# Patient Record
Sex: FEMALE | Race: WHITE | NOT HISPANIC OR LATINO | Employment: PART TIME | URBAN - METROPOLITAN AREA
[De-identification: names, ages, dates, MRNs, and addresses within clinical notes are randomized per-mention and may not be internally consistent; named-entity substitution may affect disease eponyms.]

---

## 2017-04-04 ENCOUNTER — HOSPITAL ENCOUNTER (OUTPATIENT)
Facility: MEDICAL CENTER | Age: 22
End: 2017-04-04
Attending: OBSTETRICS & GYNECOLOGY | Admitting: OBSTETRICS & GYNECOLOGY
Payer: COMMERCIAL

## 2017-04-04 VITALS
DIASTOLIC BLOOD PRESSURE: 85 MMHG | HEART RATE: 96 BPM | SYSTOLIC BLOOD PRESSURE: 113 MMHG | WEIGHT: 193 LBS | TEMPERATURE: 97.1 F | HEIGHT: 65 IN | BODY MASS INDEX: 32.15 KG/M2

## 2017-04-04 PROCEDURE — 59025 FETAL NON-STRESS TEST: CPT | Performed by: OBSTETRICS & GYNECOLOGY

## 2017-04-04 NOTE — PROGRESS NOTES
Pt presents to L&D for c/o UC's for the past hour. Pt states UC's occur every 5 minutes. Pt states + fetal movement, denies LOF, bright red vaginal bleeding. EFM and TOCO applied. SVE- closed. Pts EDC is 17 making her 39 . Pt is a . Will monitor and update MD.

## 2017-04-06 ENCOUNTER — HOSPITAL ENCOUNTER (INPATIENT)
Facility: MEDICAL CENTER | Age: 22
LOS: 2 days | End: 2017-04-08
Attending: OBSTETRICS & GYNECOLOGY | Admitting: OBSTETRICS & GYNECOLOGY
Payer: COMMERCIAL

## 2017-04-06 LAB
BASOPHILS # BLD AUTO: 0.5 % (ref 0–1.8)
BASOPHILS # BLD: 0.07 K/UL (ref 0–0.12)
EOSINOPHIL # BLD AUTO: 0.18 K/UL (ref 0–0.51)
EOSINOPHIL NFR BLD: 1.3 % (ref 0–6.9)
ERYTHROCYTE [DISTWIDTH] IN BLOOD BY AUTOMATED COUNT: 43.2 FL (ref 35.9–50)
HCT VFR BLD AUTO: 34.7 % (ref 37–47)
HGB BLD-MCNC: 11.3 G/DL (ref 12–16)
HOLDING TUBE BB 8507: NORMAL
IMM GRANULOCYTES # BLD AUTO: 0.19 K/UL (ref 0–0.11)
IMM GRANULOCYTES NFR BLD AUTO: 1.4 % (ref 0–0.9)
LYMPHOCYTES # BLD AUTO: 3.16 K/UL (ref 1–4.8)
LYMPHOCYTES NFR BLD: 22.6 % (ref 22–41)
MCH RBC QN AUTO: 26.1 PG (ref 27–33)
MCHC RBC AUTO-ENTMCNC: 32.6 G/DL (ref 33.6–35)
MCV RBC AUTO: 80.1 FL (ref 81.4–97.8)
MONOCYTES # BLD AUTO: 1.06 K/UL (ref 0–0.85)
MONOCYTES NFR BLD AUTO: 7.6 % (ref 0–13.4)
NEUTROPHILS # BLD AUTO: 9.33 K/UL (ref 2–7.15)
NEUTROPHILS NFR BLD: 66.6 % (ref 44–72)
NRBC # BLD AUTO: 0 K/UL
NRBC BLD AUTO-RTO: 0 /100 WBC
PLATELET # BLD AUTO: 162 K/UL (ref 164–446)
PMV BLD AUTO: 11.7 FL (ref 9–12.9)
RBC # BLD AUTO: 4.33 M/UL (ref 4.2–5.4)
WBC # BLD AUTO: 14 K/UL (ref 4.8–10.8)

## 2017-04-06 PROCEDURE — 59409 OBSTETRICAL CARE: CPT

## 2017-04-06 PROCEDURE — 85025 COMPLETE CBC W/AUTO DIFF WBC: CPT

## 2017-04-06 PROCEDURE — 10907ZC DRAINAGE OF AMNIOTIC FLUID, THERAPEUTIC FROM PRODUCTS OF CONCEPTION, VIA NATURAL OR ARTIFICIAL OPENING: ICD-10-PCS | Performed by: OBSTETRICS & GYNECOLOGY

## 2017-04-06 PROCEDURE — 4A1H74Z MONITORING OF PRODUCTS OF CONCEPTION, CARDIAC ELECTRICAL ACTIVITY, VIA NATURAL OR ARTIFICIAL OPENING: ICD-10-PCS | Performed by: OBSTETRICS & GYNECOLOGY

## 2017-04-06 PROCEDURE — 700111 HCHG RX REV CODE 636 W/ 250 OVERRIDE (IP)

## 2017-04-06 PROCEDURE — 0KQM0ZZ REPAIR PERINEUM MUSCLE, OPEN APPROACH: ICD-10-PCS | Performed by: OBSTETRICS & GYNECOLOGY

## 2017-04-06 PROCEDURE — 304965 HCHG RECOVERY SERVICES

## 2017-04-06 PROCEDURE — 700102 HCHG RX REV CODE 250 W/ 637 OVERRIDE(OP): Performed by: OBSTETRICS & GYNECOLOGY

## 2017-04-06 PROCEDURE — 36415 COLL VENOUS BLD VENIPUNCTURE: CPT

## 2017-04-06 PROCEDURE — 700111 HCHG RX REV CODE 636 W/ 250 OVERRIDE (IP): Performed by: OBSTETRICS & GYNECOLOGY

## 2017-04-06 PROCEDURE — 303615 HCHG EPIDURAL/SPINAL ANESTHESIA FOR LABOR

## 2017-04-06 PROCEDURE — 10H07YZ INSERTION OF OTHER DEVICE INTO PRODUCTS OF CONCEPTION, VIA NATURAL OR ARTIFICIAL OPENING: ICD-10-PCS | Performed by: OBSTETRICS & GYNECOLOGY

## 2017-04-06 PROCEDURE — A9270 NON-COVERED ITEM OR SERVICE: HCPCS | Performed by: OBSTETRICS & GYNECOLOGY

## 2017-04-06 PROCEDURE — 85027 COMPLETE CBC AUTOMATED: CPT

## 2017-04-06 PROCEDURE — 770002 HCHG ROOM/CARE - OB PRIVATE (112)

## 2017-04-06 RX ORDER — OXYCODONE AND ACETAMINOPHEN 7.5; 325 MG/1; MG/1
1 TABLET ORAL EVERY 4 HOURS PRN
Qty: 15 TAB | Refills: 0 | Status: ON HOLD | OUTPATIENT
Start: 2017-04-06 | End: 2019-02-15

## 2017-04-06 RX ORDER — AMPICILLIN 2 G/1
2 INJECTION, POWDER, FOR SOLUTION INTRAVENOUS
Status: COMPLETED | OUTPATIENT
Start: 2017-04-06 | End: 2017-04-06

## 2017-04-06 RX ORDER — DOCUSATE SODIUM 100 MG/1
100 CAPSULE, LIQUID FILLED ORAL 2 TIMES DAILY PRN
Status: DISCONTINUED | OUTPATIENT
Start: 2017-04-06 | End: 2017-04-08 | Stop reason: HOSPADM

## 2017-04-06 RX ORDER — VITAMIN A ACETATE, BETA CAROTENE, ASCORBIC ACID, CHOLECALCIFEROL, .ALPHA.-TOCOPHEROL ACETATE, DL-, THIAMINE MONONITRATE, RIBOFLAVIN, NIACINAMIDE, PYRIDOXINE HYDROCHLORIDE, FOLIC ACID, CYANOCOBALAMIN, CALCIUM CARBONATE, FERROUS FUMARATE, ZINC OXIDE, CUPRIC OXIDE 3080; 12; 120; 400; 1; 1.84; 3; 20; 22; 920; 25; 200; 27; 10; 2 [IU]/1; UG/1; MG/1; [IU]/1; MG/1; MG/1; MG/1; MG/1; MG/1; [IU]/1; MG/1; MG/1; MG/1; MG/1; MG/1
1 TABLET, FILM COATED ORAL EVERY MORNING
Status: DISCONTINUED | OUTPATIENT
Start: 2017-04-06 | End: 2017-04-08 | Stop reason: HOSPADM

## 2017-04-06 RX ORDER — OXYCODONE HYDROCHLORIDE AND ACETAMINOPHEN 5; 325 MG/1; MG/1
1 TABLET ORAL EVERY 4 HOURS PRN
Status: DISCONTINUED | OUTPATIENT
Start: 2017-04-06 | End: 2017-04-08 | Stop reason: HOSPADM

## 2017-04-06 RX ORDER — AMPICILLIN 1 G/1
1 INJECTION, POWDER, FOR SOLUTION INTRAMUSCULAR; INTRAVENOUS EVERY 4 HOURS
Status: DISCONTINUED | OUTPATIENT
Start: 2017-04-06 | End: 2017-04-06

## 2017-04-06 RX ORDER — LIDOCAINE HCL/EPINEPHRINE/PF 2%-1:200K
VIAL (ML) INJECTION
Status: DISCONTINUED
Start: 2017-04-06 | End: 2017-04-06

## 2017-04-06 RX ORDER — SODIUM CHLORIDE, SODIUM LACTATE, POTASSIUM CHLORIDE, CALCIUM CHLORIDE 600; 310; 30; 20 MG/100ML; MG/100ML; MG/100ML; MG/100ML
INJECTION, SOLUTION INTRAVENOUS PRN
Status: DISCONTINUED | OUTPATIENT
Start: 2017-04-06 | End: 2017-04-08 | Stop reason: HOSPADM

## 2017-04-06 RX ORDER — IBUPROFEN 600 MG/1
600 TABLET ORAL EVERY 6 HOURS PRN
Qty: 30 TAB | Refills: 1 | Status: ON HOLD | OUTPATIENT
Start: 2017-04-06 | End: 2019-02-15

## 2017-04-06 RX ORDER — SODIUM CHLORIDE, SODIUM LACTATE, POTASSIUM CHLORIDE, CALCIUM CHLORIDE 600; 310; 30; 20 MG/100ML; MG/100ML; MG/100ML; MG/100ML
INJECTION, SOLUTION INTRAVENOUS CONTINUOUS
Status: DISCONTINUED | OUTPATIENT
Start: 2017-04-06 | End: 2017-04-06

## 2017-04-06 RX ORDER — ONDANSETRON 2 MG/ML
4 INJECTION INTRAMUSCULAR; INTRAVENOUS EVERY 6 HOURS PRN
Status: DISCONTINUED | OUTPATIENT
Start: 2017-04-06 | End: 2017-04-08 | Stop reason: HOSPADM

## 2017-04-06 RX ORDER — MISOPROSTOL 200 UG/1
800 TABLET ORAL
Status: COMPLETED | OUTPATIENT
Start: 2017-04-06 | End: 2017-04-06

## 2017-04-06 RX ORDER — SODIUM CHLORIDE, SODIUM LACTATE, POTASSIUM CHLORIDE, CALCIUM CHLORIDE 600; 310; 30; 20 MG/100ML; MG/100ML; MG/100ML; MG/100ML
INJECTION, SOLUTION INTRAVENOUS
Status: COMPLETED
Start: 2017-04-06 | End: 2017-04-06

## 2017-04-06 RX ORDER — IBUPROFEN 600 MG/1
600 TABLET ORAL EVERY 6 HOURS PRN
Status: DISCONTINUED | OUTPATIENT
Start: 2017-04-06 | End: 2017-04-08 | Stop reason: HOSPADM

## 2017-04-06 RX ORDER — ONDANSETRON 4 MG/1
4 TABLET, ORALLY DISINTEGRATING ORAL EVERY 6 HOURS PRN
Status: DISCONTINUED | OUTPATIENT
Start: 2017-04-06 | End: 2017-04-08 | Stop reason: HOSPADM

## 2017-04-06 RX ORDER — OXYCODONE AND ACETAMINOPHEN 10; 325 MG/1; MG/1
1 TABLET ORAL EVERY 4 HOURS PRN
Status: DISCONTINUED | OUTPATIENT
Start: 2017-04-06 | End: 2017-04-08 | Stop reason: HOSPADM

## 2017-04-06 RX ORDER — ROPIVACAINE HYDROCHLORIDE 2 MG/ML
INJECTION, SOLUTION EPIDURAL; INFILTRATION; PERINEURAL
Status: COMPLETED
Start: 2017-04-06 | End: 2017-04-06

## 2017-04-06 RX ORDER — MISOPROSTOL 200 UG/1
600 TABLET ORAL
Status: DISCONTINUED | OUTPATIENT
Start: 2017-04-06 | End: 2017-04-08 | Stop reason: HOSPADM

## 2017-04-06 RX ORDER — AMPICILLIN 2 G/1
2 INJECTION, POWDER, FOR SOLUTION INTRAVENOUS ONCE
Status: DISCONTINUED | OUTPATIENT
Start: 2017-04-06 | End: 2017-04-06

## 2017-04-06 RX ADMIN — AMPICILLIN SODIUM 2 G: 2 INJECTION, POWDER, FOR SOLUTION INTRAVENOUS at 07:24

## 2017-04-06 RX ADMIN — Medication 125 MILLI-UNITS/MIN: at 16:16

## 2017-04-06 RX ADMIN — AMPICILLIN SODIUM 1 G: 1 INJECTION, POWDER, FOR SOLUTION INTRAMUSCULAR; INTRAVENOUS at 11:49

## 2017-04-06 RX ADMIN — MISOPROSTOL 800 MCG: 200 TABLET ORAL at 15:21

## 2017-04-06 RX ADMIN — SODIUM CHLORIDE, POTASSIUM CHLORIDE, SODIUM LACTATE AND CALCIUM CHLORIDE: 600; 310; 30; 20 INJECTION, SOLUTION INTRAVENOUS at 09:10

## 2017-04-06 RX ADMIN — SODIUM CHLORIDE, POTASSIUM CHLORIDE, SODIUM LACTATE AND CALCIUM CHLORIDE: 600; 310; 30; 20 INJECTION, SOLUTION INTRAVENOUS at 07:37

## 2017-04-06 RX ADMIN — Medication 1 MILLI-UNITS/MIN: at 07:24

## 2017-04-06 RX ADMIN — ROPIVACAINE HYDROCHLORIDE 200 MG: 2 INJECTION, SOLUTION EPIDURAL; INFILTRATION at 09:34

## 2017-04-06 ASSESSMENT — PATIENT HEALTH QUESTIONNAIRE - PHQ9
SUM OF ALL RESPONSES TO PHQ9 QUESTIONS 1 AND 2: 0
2. FEELING DOWN, DEPRESSED, IRRITABLE, OR HOPELESS: NOT AT ALL
1. LITTLE INTEREST OR PLEASURE IN DOING THINGS: NOT AT ALL
SUM OF ALL RESPONSES TO PHQ QUESTIONS 1-9: 0

## 2017-04-06 ASSESSMENT — LIFESTYLE VARIABLES
ALCOHOL_USE: NO
EVER_SMOKED: NEVER
DO YOU DRINK ALCOHOL: NO

## 2017-04-06 ASSESSMENT — PAIN SCALES - GENERAL
PAINLEVEL_OUTOF10: 3
PAINLEVEL_OUTOF10: 0

## 2017-04-06 ASSESSMENT — COPD QUESTIONNAIRES
HAVE YOU SMOKED AT LEAST 100 CIGARETTES IN YOUR ENTIRE LIFE: NO/DON'T KNOW
DURING THE PAST 4 WEEKS HOW MUCH DID YOU FEEL SHORT OF BREATH: NONE/LITTLE OF THE TIME
DO YOU EVER COUGH UP ANY MUCUS OR PHLEGM?: NO/ONLY WITH OCCASIONAL COLDS OR INFECTIONS

## 2017-04-06 NOTE — IP AVS SNAPSHOT
4/8/2017          Rai Carrero  [address]  [place] [address]    Dear Rai:    [facility] wants to ensure your discharge home is safe and you or your loved ones have had all your questions answered regarding your care after you leave the hospital.    You may receive a telephone call within two days of your discharge.  This call is to make certain you understand your discharge instructions as well as ensure we provided you with the best care possible during your stay with us.     The call will only last approximately 3-5 minutes and will be done by a nurse.    Once again, we want to ensure your discharge home is safe and that you have a clear understanding of any next steps in your care.  If you have any questions or concerns, please do not hesitate to contact us, we are here for you.  Thank you for choosing [facility] for your healthcare needs.    Sincerely,    Cali Huntley    [facility]

## 2017-04-06 NOTE — H&P
CHIEF COMPLAINT:  Here for induction of labor.    HISTORY OF PRESENT ILLNESS:  Patient is a 21-year-old  2, para 0 female   with a due date of , placing her at 40+ weeks gestation, admitted   for elective induction of labor.  She began her prenatal care at 6 weeks'   gestational age.  Dating is based on her early ultrasound.  She has had   comprehensive prenatal care and she did test positive for group B strep   recently.  She has had no other identified risk factors.    PAST MEDICAL HISTORY:  Patient has no significant medical illnesses.    PREVIOUS SURGERIES:  Include tonsillectomy.    FAMILY HISTORY:  Noncontributory.    SOCIAL HISTORY:  She is single, works at [employer].  She has a supportive   significant other, does not smoke.    REVIEW OF SYSTEMS:  She has had no leaking of fluid or vaginal bleeding.    PHYSICAL EXAMINATION:  GENERAL:  She is pleasant, healthy appearing, in no distress.  HEAD AND NECK:  Unremarkable.  LUNGS:  Clear.  HEART:  Regular rate and rhythm.  ABDOMEN:  Estimated fetal weight is 8 pounds.  GENITOURINARY:  Cervix is 2 cm dilated, -2 station, 80%, membranes were   ruptured for clear fluid and intrauterine pressure monitor was placed.    IMPRESSION:  1.  Term pregnancy.  2.  Favorable cervix, desires induction.    PLAN:  Patient will have a Pitocin amniotomy induction of labor.  She is aware   of the options expected management.  She is aware of the risks and   complications associated with induction of labor.  Anticipate vaginal   delivery.       ____________________________________     MD CORBY RAYMOND / NEGRO    DD:  2017 07:39:52  DT:  2017 07:50:05    D#:  [id number]  Job#:  [id number]

## 2017-04-06 NOTE — PROGRESS NOTES
of via Male at 1507 delivered by Dr. Cano Apgars 8/9. Mom and baby stable, baby skin to skin / swadled and handed to mom to bond. 2 lac, repaired. No complications. Patient cleaned and settled for comfort. family at bedside. Patient now recovering, will continue to monitor and assess mom and baby.for remainder of her recovery.

## 2017-04-06 NOTE — IP AVS SNAPSHOT
Home Care Instructions                                                                                                                Rai Carrero   MRN: [id number]    Department:  POST PARTUM 31   2017           Follow-up Information     1. Follow up with Aly Cano M.D.. Schedule an appointment as soon as possible for a visit in 6 weeks.    Specialty:  OB/Gyn    Contact information    [address]  [address]  [place] [address]  [phone]         I assume responsibility for securing a follow-up [place] Screening blood test on my baby within the specified date range.    -                  Discharge Instructions       POSTPARTUM DISCHARGE INSTRUCTIONS FOR MOM    YOB: 1995   Age: 21 y.o.               Admit Date: 2017     Discharge Date: 2017  Attending Doctor:  Aly Cano M.D.                  Allergies:  Review of patient's allergies indicates no known allergies.    Discharged to home by car. Discharged via wheelchair, hospital escort: Yes.  Special equipment needed: Not Applicable  Belongings with: Personal  Be sure to schedule a follow-up appointment with your primary care doctor or any specialists as instructed.     Discharge Plan:   Influenza Vaccine Indication: Patient Refuses    REASONS TO CALL YOUR OBSTETRICIAN:  1.   Persistent fever or shaking chills (Temperature higher than 100.4)  2.   Heavy bleeding (soaking more than 1 pad per hour); Passing clots  3.   Foul odor from vagina  4.   Mastitis (Breast infection; breast pain, chills, fever, redness)  5.   Urinary pain, burning or frequency  6.   Episiotomy infection  7.   Abdominal incision infection  8.   Severe depression longer than 24 hours    HAND WASHING  · Prior to handling the baby.  · Before breastfeeding or bottle feeding baby.  · After using the bathroom or changing the baby's diaper.    WOUND CARE  Ask your physician for additional care instructions.  In general:    ·  Incision:      · Keep clean and  "dry.    · Do NOT lift anything heavier than your baby for up to 6 weeks.    · There should not be any opening or pus.      VAGINAL CARE  · Nothing inside vagina for 6 weeks: no sexual intercourse, tampons or douching.  · Bleeding may continue for 2-4 weeks.  Amount may vary.    · Call your physician for heavy bleeding which means soaking more than 1 pad per hour    BIRTH CONTROL  · It is possible to become pregnant at any time after delivery and while breastfeeding.  · Plan to discuss a method of birth control with your physician at your follow up visit. visit.    DIET AND ELIMINATION  · Eating more fiber (bran cereal, fruits, and vegetables) and drinking plenty of fluids will help to avoid constipation.  · Urinary frequency after childbirth is normal.    POSTPARTUM BLUES  During the first few days after birth, you may experience a sense of the \"blues\" which may include impatience, irritability or even crying.  These feeling come and go quickly.  However, as many as 1 in 10 women experience emotional symptoms known as postpartum depression.    Postpartum depression:  May start as early as the second or third day after delivery or take several weeks or months to develop.  Symptoms of \"blues\" are present, but are more intense:  Crying spells; loss of appetite; feelings of hopelessness or loss of control; fear of touching the baby; over concern or no concern at all about the baby; little or no concern about your own appearance/caring for yourself; and/or inability to sleep or excessive sleeping.  Contact your physician if you are experiencing any of these symptoms.    Crisis Hotline:  · [place] Crisis Hotline:  7-619-BCGDYPW  Or 1-942.613.3528  · [place] Crisis Hotline:  1-225.153.8293  Or 087-258-6626    PREVENTING SHAKEN BABY:  If you are angry or stressed, PUT THE BABY IN THE CRIB, step away, take some deep breaths, and wait until you are calm to care for the baby.  DO NOT SHAKE THE BABY.  You are not alone, call a " "supporter for help.    · Crisis Call Center 24/7 crisis line 231-746-1061 or 1-256.837.1215  · You can also text them, text \"ANSWER\" to 040504    QUIT SMOKING/TOBACCO USE:  I understand the use of any tobacco products increases my chance of suffering from future heart disease and could cause other illnesses which may shorten my life. Quitting the use of tobacco products is the single most important thing I can do to improve my health. For further information on smoking / tobacco cessation call a Toll Free Quit Line at 1-359.832.7547 (*[employer] [place]) or 1-247.630.9159 (American Lung Association) or you can access the web based program at www.lungusa.org.    · [place] Tobacco Users Help Line:  (614) 547-6591       Toll Free: 1-792.866.7375  · Quit Tobacco Program [facility] (979)287-8901    DEPRESSION / SUICIDE RISK:  As you are discharged from this [facility], it is important to learn how to keep safe from harming yourself.    Recognize the warning signs:  · Abrupt changes in personality, positive or negative- including increase in energy   · Giving away possessions  · Change in eating patterns- significant weight changes-  positive or negative  · Change in sleeping patterns- unable to sleep or sleeping all the time   · Unwillingness or inability to communicate  · Depression  · Unusual sadness, discouragement and loneliness  · Talk of wanting to die  · Neglect of personal appearance   · Rebelliousness- reckless behavior  · Withdrawal from people/activities they love  · Confusion- inability to concentrate     If you or a loved one observes any of these behaviors or has concerns about self-harm, here's what you can do:  · Talk about it- your feelings and reasons for harming yourself  · Remove any means that you might use to hurt yourself (examples: pills, rope, extension cords, firearm)  · Get professional help from the community (Mental Health, Substance Abuse, " psychological counseling)  · Do not be alone:Call your Safe Contact- someone whom you trust who will be there for you.  · Call your local CRISIS HOTLINE 288-6464 or 846-460-4480  · Call your local Children's Mobile Crisis Response Team [place] (682) 068-8453 or www.[employer]  · Call the toll free National Suicide Prevention Hotlines   · National Suicide Prevention Lifeline 906-143-MBKK (2297)  · National Hope Line Network 800-SUICIDE (016-1442)    DISCHARGE SURVEY:  Thank you for choosing [facility].  We hope we provided you with very good care.  You may be receiving a survey in the mail.  Please fill it out.  Your opinion is valuable to us.    ADDITIONAL EDUCATIONAL MATERIALS GIVEN TO PATIENT:        My signature on this form indicates that:  1.  I have reviewed and understand the above information  2.  My questions regarding this information have been answered to my satisfaction.  3.  I have formulated a plan with my discharge nurse to obtain my prescribed medication for home.         Discharge Medication Instructions:    Below are the medications your physician expects you to take upon discharge:    Review all your home medications and newly ordered medications with your doctor and/or pharmacist. Follow medication instructions as directed by your doctor and/or pharmacist.    Please keep your medication list with you and share with your physician.               Medication List      START taking these medications        Instructions    oxycodone-acetaminophen 7.5-325 MG per tablet   Commonly known as:  PERCOCET    Take 1 Tab by mouth every four hours as needed.   Dose:  1 Tab         CHANGE how you take these medications        Instructions    ibuprofen 600 MG Tabs   What changed:    - medication strength  - how much to take   Last time this was given:  600 mg on 4/8/2017  1:49 AM   Commonly known as:  MOTRIN    Take 1 Tab by mouth every 6 hours as needed.   Dose:  600 mg         CONTINUE taking  these medications        Instructions    omeprazole 20 MG delayed-release capsule   Commonly known as:  PRILOSEC    Take 1 Cap by mouth every day.   Dose:  20 mg       PRE-RODRIGUE PO    Take  by mouth.       sucralfate 1 GM Tabs   Commonly known as:  CARAFATE    Take 1 Tab by mouth 4 Times a Day,Before Meals and at Bedtime.   Dose:  1 g       TYLENOL PO    Take  by mouth.               Crisis Hotline:     [place] Crisis Hotline:  0-701-XJWQSKS or 1-446.611.7697    [place] Crisis Hotline:    1-321.578.1811 or 908-162-1440        Disclaimer           _____________________________________                     __________       ________       Patient/Mother Signature or Legal                          Date                   Time

## 2017-04-06 NOTE — CARE PLAN
Problem: Pain  Goal: Patient will have relaxed facial expressions and be able to rest between uterine contractions  Intervention: Administer pain medications per MD order and/or assist with epidural/spinal placement  Pt comfortable with her epidural resting quietly no needs

## 2017-04-06 NOTE — IP AVS SNAPSHOT
Animating Touch Access Code: [email or web address]  Expires: 5/8/2017  8:32 AM    Animating Touch  A secure, online tool to manage your health information     [employer]’s Animating Touch® is a secure, online tool that connects you to your personalized health information from the privacy of your home -- day or night - making it very easy for you to manage your healthcare. Once the activation process is completed, you can even access your medical information using the Animating Touch willem, which is available for free in the Apple Willem store or Google Play store.     Animating Touch provides the following levels of access (as shown below):   My Chart Features   [facility] Primary Care Doctor [facility]  Specialists [facility]  Urgent  Care [facility]  Primary Care  Doctor   Email your healthcare team securely and privately 24/7 X X X X   Manage appointments: schedule your next appointment; view details of past/upcoming appointments X      Request prescription refills. X      View recent personal medical records, including lab and immunizations X X X X   View health record, including health history, allergies, medications X X X X   Read reports about your outpatient visits, procedures, consult and ER notes X X X X   See your discharge summary, which is a recap of your hospital and/or ER visit that includes your diagnosis, lab results, and care plan. X X       How to register for Animating Touch:  1. Go to  [email or web address].  2. Click on the Sign Up Now box, which takes you to the New Member Sign Up page. You will need to provide the following information:  a. Enter your Animating Touch Access Code exactly as it appears at the top of this page. (You will not need to use this code after you’ve completed the sign-up process. If you do not sign up before the expiration date, you must request a new code.)   b. Enter your date of birth.   c. Enter your home email address.   d. Click Submit, and follow the next screen’s instructions.  3. Create a Animating Touch ID. This will be your Animating Touch  login ID and cannot be changed, so think of one that is secure and easy to remember.  4. Create a [employer] password. You can change your password at any time.  5. Enter your Password Reset Question and Answer. This can be used at a later time if you forget your password.   6. Enter your e-mail address. This allows you to receive e-mail notifications when new information is available in Pavlok.  7. Click Sign Up. You can now view your health information.    For assistance activating your Pavlok account, call [phone]

## 2017-04-06 NOTE — PROGRESS NOTES
"Progress Note    Rai Knight   PP day 1  Chief Admitting Dx: Pregnancy  Delivery Type: vaginal, spontaneous      Subjective:  Ambulating: yes  Tolerating oral feed: yes  Flatus: yes    Voiding: yes  Dizziness: no  Filed Vitals:    04/06/17 0600   Height: 1.641 m (5' 4.61\")   Weight: 88.451 kg (195 lb)       Exam:  Abdomen: negative findings: soft, non-tender  Incision: none  Fundus Tenderness: no  Below umbilicus: yes  Perineum: midline episiotomy  Extremities: Normal  Lochia: mild    Labs:   Recent Results (from the past 24 hour(s))   CBC WITH DIFFERENTIAL    Collection Time: 04/06/17  6:30 AM   Result Value Ref Range    WBC 14.0 (H) 4.8 - 10.8 K/uL    RBC 4.33 4.20 - 5.40 M/uL    Hemoglobin 11.3 (L) 12.0 - 16.0 g/dL    Hematocrit 34.7 (L) 37.0 - 47.0 %    MCV 80.1 (L) 81.4 - 97.8 fL    MCH 26.1 (L) 27.0 - 33.0 pg    MCHC 32.6 (L) 33.6 - 35.0 g/dL    RDW 43.2 35.9 - 50.0 fL    Platelet Count 162 (L) 164 - 446 K/uL    MPV 11.7 9.0 - 12.9 fL    Neutrophils-Polys 66.60 44.00 - 72.00 %    Lymphocytes 22.60 22.00 - 41.00 %    Monocytes 7.60 0.00 - 13.40 %    Eosinophils 1.30 0.00 - 6.90 %    Basophils 0.50 0.00 - 1.80 %    Immature Granulocytes 1.40 (H) 0.00 - 0.90 %    Nucleated RBC 0.00 /100 WBC    Neutrophils (Absolute) 9.33 (H) 2.00 - 7.15 K/uL    Lymphs (Absolute) 3.16 1.00 - 4.80 K/uL    Monos (Absolute) 1.06 (H) 0.00 - 0.85 K/uL    Eos (Absolute) 0.18 0.00 - 0.51 K/uL    Baso (Absolute) 0.07 0.00 - 0.12 K/uL    Immature Granulocytes (abs) 0.19 (H) 0.00 - 0.11 K/uL    NRBC (Absolute) 0.00 K/uL   HOLD BLOOD BANK SPECIMEN (NOT TESTED)    Collection Time: 04/06/17  6:30 AM   Result Value Ref Range    Holding Tube - Bb DONE        Assessment:  Continue Routine postpartum care  Stable post partum      Plan:  Discharge Home    Aly Cano M.D.        "

## 2017-04-06 NOTE — CARE PLAN
Problem: Knowledge Deficit  Goal: Patient/Support person demonstrates understanding regarding the progression of labor, available options and participates in decision-making process  Intervention: Obtain imformed consent when appropriate  Pt consented for an epidural

## 2017-04-07 LAB
ERYTHROCYTE [DISTWIDTH] IN BLOOD BY AUTOMATED COUNT: 43 FL (ref 35.9–50)
HCT VFR BLD AUTO: 27.3 % (ref 37–47)
HGB BLD-MCNC: 9 G/DL (ref 12–16)
MCH RBC QN AUTO: 26.4 PG (ref 27–33)
MCHC RBC AUTO-ENTMCNC: 33 G/DL (ref 33.6–35)
MCV RBC AUTO: 80.1 FL (ref 81.4–97.8)
PLATELET # BLD AUTO: 142 K/UL (ref 164–446)
PMV BLD AUTO: 12 FL (ref 9–12.9)
RBC # BLD AUTO: 3.41 M/UL (ref 4.2–5.4)
WBC # BLD AUTO: 18.8 K/UL (ref 4.8–10.8)

## 2017-04-07 PROCEDURE — A9270 NON-COVERED ITEM OR SERVICE: HCPCS | Performed by: OBSTETRICS & GYNECOLOGY

## 2017-04-07 PROCEDURE — 770002 HCHG ROOM/CARE - OB PRIVATE (112)

## 2017-04-07 PROCEDURE — 700102 HCHG RX REV CODE 250 W/ 637 OVERRIDE(OP): Performed by: OBSTETRICS & GYNECOLOGY

## 2017-04-07 PROCEDURE — 700112 HCHG RX REV CODE 229: Performed by: OBSTETRICS & GYNECOLOGY

## 2017-04-07 RX ADMIN — IBUPROFEN 600 MG: 600 TABLET, FILM COATED ORAL at 12:51

## 2017-04-07 RX ADMIN — OXYCODONE HYDROCHLORIDE AND ACETAMINOPHEN 1 TABLET: 5; 325 TABLET ORAL at 22:47

## 2017-04-07 RX ADMIN — OXYCODONE HYDROCHLORIDE AND ACETAMINOPHEN 1 TABLET: 5; 325 TABLET ORAL at 05:03

## 2017-04-07 RX ADMIN — Medication 1 TABLET: at 08:33

## 2017-04-07 RX ADMIN — IBUPROFEN 600 MG: 600 TABLET, FILM COATED ORAL at 19:24

## 2017-04-07 RX ADMIN — DOCUSATE SODIUM 100 MG: 100 CAPSULE ORAL at 08:33

## 2017-04-07 RX ADMIN — OXYCODONE HYDROCHLORIDE AND ACETAMINOPHEN 1 TABLET: 5; 325 TABLET ORAL at 12:51

## 2017-04-07 ASSESSMENT — PAIN SCALES - GENERAL
PAINLEVEL_OUTOF10: 0
PAINLEVEL_OUTOF10: 6
PAINLEVEL_OUTOF10: 3
PAINLEVEL_OUTOF10: 0
PAINLEVEL_OUTOF10: 3

## 2017-04-07 NOTE — PROGRESS NOTES
Received report from Amina Frances RN; Assumed care of pt.    2200- POC discussed with pt and opportunity provided for questions.  Answers given to questions and pt verbalized understanding of information provided to her.  Denies having any further questions at this time.  Pt's pad found saturated on top half of pad.  During fundal check, uterus found to be firm with light to moderate rubra lochia observed.  Fundal massage performed and lochia became scant to light.  No signs of distress observed in pt.  FOB at bedside for support.  Will continue to monitor per hospital policy.  Pt requests to receive pain medication on a prn basis and will call RN if pain medication is needed.      0130- Fundal check performed and uterus off slightly to the right.  Pt states she has to void.  Pt up to bathroom. (See flow sheet for documentation of fundal check)

## 2017-04-07 NOTE — CARE PLAN
Problem: Altered physiologic condition related to immediate post-delivery state and potential for bleeding/hemorrhage  Goal: Patient physiologically stable as evidenced by normal lochia, palpable uterine involution and vital signs within normal limits  Intervention: Perform physical assessment and obtain vital signs on patient as directed in Intrapartum/Postpartum Standard of Care in Policy and Procedure manual  Assessment done, fundus firm,light lochia.Fundal massage done.

## 2017-04-07 NOTE — PROGRESS NOTES
@1730 Pt up to bathroom with standby assist - steady gait.  Pericare done, pads applied.  Pt to PP @1740 via w/c with all personal belongings.  Report to Ysabel SHORT. pt. care cont.

## 2017-04-07 NOTE — PROGRESS NOTES
Parents asking if we can provide their insurance company ([employer]) breast pump, educated that we have HG rental pumps/WIC pumps?HHP pumps only at [facility], encouraged to call insurance company to arrange to get breast pump, mother states BF well and denies pain and/or need for BF assistance at this time, encouraged to call for assistance as needed.

## 2017-04-07 NOTE — CARE PLAN
Problem: Alteration in comfort related to episiotomy, vaginal repair and/or after birth pains  Goal: Patient is able to ambulate, care for self and infant  Outcome: PROGRESSING AS EXPECTED  Pain management explained ,said she will ask prn.

## 2017-04-07 NOTE — PROGRESS NOTES
Receive patient from labor and delivery. Initial assessment completed. Then skin to skin with baby and attempt to breast feed with family assist.

## 2017-04-07 NOTE — DELIVERY NOTE
DATE OF SERVICE:  04/06/2017    TYPE OF DELIVERY:  Spontaneous vaginal delivery.    HISTORY OF PRESENT ILLNESS:  A 21-year-old primigravida female at 40 weeks and   1 day gestation, who was admitted for elective induction of labor.  She had a   favorable cervix.  She was started on IV antibiotics for group B strep   positive status.  Her membranes ruptured for clear fluid around 7:30 in the   morning.  She was 2 cm dilated at that time.  She did have an epidural   anesthetic for pain relief.  She made good progress in labor, reached complete   dilation by around 2:00 p.m.  She pushed just around an hour delivering   spontaneously and vaginally in occiput anterior position a healthy female   infant, Apgars 8 and 9, weight pending.  A second-degree perineal injury   occurred, which was repaired with 3-0 Vicryl.  The placenta delivered simply   intact within 5 minutes.  There was moderate uterine atony, and for this   reason, I gave her misoprostol 800 mcg intrarectally.  This seemed to control   and helped to contract her uterus.  Otherwise, there were no complications.       ____________________________________     MD CORBY RAYMOND / NEGRO    DD:  04/06/2017 15:34:53  DT:  04/06/2017 15:54:45    D#:  [id number]  Job#:  [id number]

## 2017-04-07 NOTE — PROGRESS NOTES
Receive patient from labor and delivery. ID band check and will replace, Cuddle check. Initial assessment completed. Skin to skin with mother and attempt to breast feed with family member assistant.

## 2017-04-07 NOTE — CARE PLAN
Problem: Alteration in comfort related to episiotomy, vaginal repair and/or after birth pains  Goal: Patient verbalizes acceptable pain level  Outcome: PROGRESSING AS EXPECTED  Pt states pain level is tolerable at pain level 3/10 at perineum.  Denies need for pain medication at this time and states will call RN should pain medication be needed.  Pt is able to ambulate and care for herself and infant without any signs of distress or difficulty.  Will continue to monitor per hospital policy.  Q2 hour rounding on pt and infant remain in effect.

## 2017-04-07 NOTE — CARE PLAN
Problem: Altered physiologic condition related to immediate post-delivery state and potential for bleeding/hemorrhage  Goal: Patient physiologically stable as evidenced by normal lochia, palpable uterine involution and vital signs within normal limits  Outcome: PROGRESSING AS EXPECTED  Pt's fundus remains firm with light to moderate rubra lochia observed.  Fundal rub performed and lochia became scant to light in flow.  No signs of hemorrhage are present at this time.  Will continue to monitor per hospital policy.

## 2017-04-08 VITALS
HEIGHT: 65 IN | BODY MASS INDEX: 32.49 KG/M2 | SYSTOLIC BLOOD PRESSURE: 106 MMHG | TEMPERATURE: 98.1 F | HEART RATE: 80 BPM | RESPIRATION RATE: 20 BRPM | WEIGHT: 195 LBS | OXYGEN SATURATION: 96 % | DIASTOLIC BLOOD PRESSURE: 58 MMHG

## 2017-04-08 PROCEDURE — 700102 HCHG RX REV CODE 250 W/ 637 OVERRIDE(OP): Performed by: OBSTETRICS & GYNECOLOGY

## 2017-04-08 PROCEDURE — 700112 HCHG RX REV CODE 229: Performed by: OBSTETRICS & GYNECOLOGY

## 2017-04-08 PROCEDURE — A9270 NON-COVERED ITEM OR SERVICE: HCPCS | Performed by: OBSTETRICS & GYNECOLOGY

## 2017-04-08 RX ADMIN — IBUPROFEN 600 MG: 600 TABLET, FILM COATED ORAL at 01:49

## 2017-04-08 RX ADMIN — Medication 1 TABLET: at 08:45

## 2017-04-08 RX ADMIN — OXYCODONE HYDROCHLORIDE AND ACETAMINOPHEN 1 TABLET: 5; 325 TABLET ORAL at 06:17

## 2017-04-08 RX ADMIN — DOCUSATE SODIUM 100 MG: 100 CAPSULE ORAL at 08:45

## 2017-04-08 RX ADMIN — IBUPROFEN 600 MG: 600 TABLET, FILM COATED ORAL at 08:45

## 2017-04-08 ASSESSMENT — PAIN SCALES - GENERAL
PAINLEVEL_OUTOF10: 1
PAINLEVEL_OUTOF10: 5
PAINLEVEL_OUTOF10: 3
PAINLEVEL_OUTOF10: 4
PAINLEVEL_OUTOF10: 0

## 2017-04-08 NOTE — CARE PLAN
Problem: Communication  Goal: The ability to communicate needs accurately and effectively will improve  Outcome: PROGRESSING AS EXPECTED  Pt. Ambulating to bathroom, taking adequate PO fluids, breastfeeding infant.     Problem: Pain Management  Goal: Pain level will decrease to patient’s comfort goal  Outcome: PROGRESSING AS EXPECTED  Pt. Would like to call for PRN pain meds when she needs them.

## 2017-04-08 NOTE — DISCHARGE INSTRUCTIONS
POSTPARTUM DISCHARGE INSTRUCTIONS FOR MOM    YOB: 1995   Age: 21 y.o.               Admit Date: 2017     Discharge Date: 2017  Attending Doctor:  Aly Cano M.D.                  Allergies:  Review of patient's allergies indicates no known allergies.    Discharged to home by car. Discharged via wheelchair, hospital escort: Yes.  Special equipment needed: Not Applicable  Belongings with: Personal  Be sure to schedule a follow-up appointment with your primary care doctor or any specialists as instructed.     Discharge Plan:   Influenza Vaccine Indication: Patient Refuses    REASONS TO CALL YOUR OBSTETRICIAN:  1.   Persistent fever or shaking chills (Temperature higher than 100.4)  2.   Heavy bleeding (soaking more than 1 pad per hour); Passing clots  3.   Foul odor from vagina  4.   Mastitis (Breast infection; breast pain, chills, fever, redness)  5.   Urinary pain, burning or frequency  6.   Episiotomy infection  7.   Abdominal incision infection  8.   Severe depression longer than 24 hours    HAND WASHING  · Prior to handling the baby.  · Before breastfeeding or bottle feeding baby.  · After using the bathroom or changing the baby's diaper.    WOUND CARE  Ask your physician for additional care instructions.  In general:    ·  Incision:      · Keep clean and dry.    · Do NOT lift anything heavier than your baby for up to 6 weeks.    · There should not be any opening or pus.      VAGINAL CARE  · Nothing inside vagina for 6 weeks: no sexual intercourse, tampons or douching.  · Bleeding may continue for 2-4 weeks.  Amount may vary.    · Call your physician for heavy bleeding which means soaking more than 1 pad per hour    BIRTH CONTROL  · It is possible to become pregnant at any time after delivery and while breastfeeding.  · Plan to discuss a method of birth control with your physician at your follow up visit. visit.    DIET AND ELIMINATION  · Eating more fiber (bran cereal, fruits,  "and vegetables) and drinking plenty of fluids will help to avoid constipation.  · Urinary frequency after childbirth is normal.    POSTPARTUM BLUES  During the first few days after birth, you may experience a sense of the \"blues\" which may include impatience, irritability or even crying.  These feeling come and go quickly.  However, as many as 1 in 10 women experience emotional symptoms known as postpartum depression.    Postpartum depression:  May start as early as the second or third day after delivery or take several weeks or months to develop.  Symptoms of \"blues\" are present, but are more intense:  Crying spells; loss of appetite; feelings of hopelessness or loss of control; fear of touching the baby; over concern or no concern at all about the baby; little or no concern about your own appearance/caring for yourself; and/or inability to sleep or excessive sleeping.  Contact your physician if you are experiencing any of these symptoms.    Crisis Hotline:  · [place] Crisis Hotline:  0-195-RUMCNUD  Or 1-693.962.8906  · [place] Crisis Hotline:  1-530.879.6114  Or 128-484-9773    PREVENTING SHAKEN BABY:  If you are angry or stressed, PUT THE BABY IN THE CRIB, step away, take some deep breaths, and wait until you are calm to care for the baby.  DO NOT SHAKE THE BABY.  You are not alone, call a supporter for help.    · Crisis Call Center 24/7 crisis line 996-534-8425 or 1-687.916.1593  · You can also text them, text \"ANSWER\" to 739070    QUIT SMOKING/TOBACCO USE:  I understand the use of any tobacco products increases my chance of suffering from future heart disease and could cause other illnesses which may shorten my life. Quitting the use of tobacco products is the single most important thing I can do to improve my health. For further information on smoking / tobacco cessation call a Toll Free Quit Line at 1-820.530.7728 (*[employer] [place]) or 1-556.589.5759 (American Lung Association) or you can access the " web based program at www.lungusa.org.    · [place] Tobacco Users Help Line:  (475) 375-2505       Toll Free: 1-858.820.4006  · Quit Tobacco Program [facility] (723)232-5105    DEPRESSION / SUICIDE RISK:  As you are discharged from this [facility], it is important to learn how to keep safe from harming yourself.    Recognize the warning signs:  · Abrupt changes in personality, positive or negative- including increase in energy   · Giving away possessions  · Change in eating patterns- significant weight changes-  positive or negative  · Change in sleeping patterns- unable to sleep or sleeping all the time   · Unwillingness or inability to communicate  · Depression  · Unusual sadness, discouragement and loneliness  · Talk of wanting to die  · Neglect of personal appearance   · Rebelliousness- reckless behavior  · Withdrawal from people/activities they love  · Confusion- inability to concentrate     If you or a loved one observes any of these behaviors or has concerns about self-harm, here's what you can do:  · Talk about it- your feelings and reasons for harming yourself  · Remove any means that you might use to hurt yourself (examples: pills, rope, extension cords, firearm)  · Get professional help from the community (Mental Health, Substance Abuse, psychological counseling)  · Do not be alone:Call your Safe Contact- someone whom you trust who will be there for you.  · Call your local CRISIS HOTLINE 707-1470 or 499-794-9081  · Call your local Children's Mobile Crisis Response Team [place] (264) 476-6000 or www.Secondbrain  · Call the toll free National Suicide Prevention Hotlines   · National Suicide Prevention Lifeline 203-441-CZHF (9923)  · National Hope Line Network 800-SUICIDE (338-2713)    DISCHARGE SURVEY:  Thank you for choosing [facility].  We hope we provided you with very good care.  You may be receiving a survey in the mail.  Please fill it out.  Your opinion  is valuable to us.    ADDITIONAL EDUCATIONAL MATERIALS GIVEN TO PATIENT:        My signature on this form indicates that:  1.  I have reviewed and understand the above information  2.  My questions regarding this information have been answered to my satisfaction.  3.  I have formulated a plan with my discharge nurse to obtain my prescribed medication for home.

## 2017-04-08 NOTE — CARE PLAN
Problem: Potential for postpartum infection related to presence of episiotomy/vaginal tear and/or uterine contamination  Goal: Patient will be absent from signs and symptoms of infection  Outcome: PROGRESSING AS EXPECTED  No signs of infection noted.

## 2017-04-08 NOTE — DISCHARGE SUMMARY
This is a prior patient of Dr. Aly Cano.    DATE OF ADMISSION:  4/6/2017    DATE OF DISCHARGE:  4/8/2017    ADMITTING DIAGNOSIS:  Intrauterine pregnancy at term.    DISCHARGE DIAGNOSES:  Intrauterine pregnancy at term plus status post normal   spontaneous vaginal delivery.    HOSPITAL COURSE:  The patient was admitted and underwent vaginal delivery,   uncomplicated.  She is postpartum day #2 and stable for discharge home.  She   will have prescriptions written for Percocet and Motrin.  She is instructed to   follow up with Dr. Cano in 6 weeks' time.  Her postpartum hemoglobin was   stable at 9.0.       ____________________________________     Corinne E. Capurro, MD [facility] / [facility]    DD:  04/08/2017 06:06:04  DT:  04/08/2017 06:15:44    D#:  [id number]  Job#:  [id number]

## 2018-02-14 ENCOUNTER — OFFICE VISIT (OUTPATIENT)
Dept: MEDICAL GROUP | Facility: CLINIC | Age: 23
End: 2018-02-14
Payer: COMMERCIAL

## 2018-02-14 VITALS
TEMPERATURE: 98.2 F | WEIGHT: 161 LBS | HEIGHT: 64 IN | SYSTOLIC BLOOD PRESSURE: 116 MMHG | DIASTOLIC BLOOD PRESSURE: 70 MMHG | RESPIRATION RATE: 14 BRPM | BODY MASS INDEX: 27.49 KG/M2 | HEART RATE: 76 BPM | OXYGEN SATURATION: 98 %

## 2018-02-14 DIAGNOSIS — N63.10 MASS OF RIGHT BREAST: ICD-10-CM

## 2018-02-14 PROCEDURE — 99213 OFFICE O/P EST LOW 20 MIN: CPT | Performed by: FAMILY MEDICINE

## 2018-02-14 ASSESSMENT — PAIN SCALES - GENERAL: PAINLEVEL: 2=MINIMAL-SLIGHT

## 2018-02-14 ASSESSMENT — PATIENT HEALTH QUESTIONNAIRE - PHQ9
SUM OF ALL RESPONSES TO PHQ QUESTIONS 1-9: 10
CLINICAL INTERPRETATION OF PHQ2 SCORE: 4
5. POOR APPETITE OR OVEREATING: 0 - NOT AT ALL

## 2018-02-14 NOTE — PROGRESS NOTES
"Chief Complaint   Patient presents with   • Breast Mass     Right breast x2 months       Subjective:     HPI:   Rai Carrero presents today with the followin. Mass of right breast  First noted a thickness and impression of general mass in her right breast around 2 months ago. It is nontender. She expected this to go away but it has persisted. She feels it may actually have gotten a little larger. She is breast-feeding. Denies redness, heat or any fevers. Her baby is now about 6 months old.  I also feel this mass in the area. Ultrasound and diagnostic mammogram orders discussed and placed.    Also discussed with the patient that she appears to have several immunizations pending. She believes she didn't get these immunizations. She will look for her records. She is following up soon with her gynecologist for her Pap smear.        Patient Active Problem List    Diagnosis Date Noted   • Mass of right breast 2018       Current medicines (including changes today)  Current Outpatient Prescriptions   Medication Sig Dispense Refill   • ibuprofen (MOTRIN) 600 MG Tab Take 1 Tab by mouth every 6 hours as needed. 30 Tab 1   • oxycodone-acetaminophen (PERCOCET) 7.5-325 MG per tablet Take 1 Tab by mouth every four hours as needed. 15 Tab 0   • Acetaminophen (TYLENOL PO) Take  by mouth.     • Prenatal Multivit-Min-Fe-FA (PRE- PO) Take  by mouth.     • omeprazole (PRILOSEC) 20 MG delayed-release capsule Take 1 Cap by mouth every day. 14 Cap 0   • sucralfate (CARAFATE) 1 GM Tab Take 1 Tab by mouth 4 Times a Day,Before Meals and at Bedtime. 60 Tab 0     No current facility-administered medications for this visit.        No Known Allergies    ROS: As per HPI       Objective:     Blood pressure 116/70, pulse 76, temperature 36.8 °C (98.2 °F), resp. rate 14, height 1.626 m (5' 4\"), weight 73 kg (161 lb), last menstrual period 2017, SpO2 98 %, not currently breastfeeding. Body mass index is 27.64 " kg/m².    Physical Exam:  Constitutional: Well-developed and well-nourished. Not diaphoretic. No distress. Lucid and fluent.  Skin: Skin is warm and dry. No rash noted.  Head: Atraumatic without lesions.  Eyes: Conjunctivae and extraocular motions are normal. Pupils are equal, round, and reactive to light. No scleral icterus.   Mouth/Throat: Tongue normal. Oropharynx is clear and moist. Posterior pharynx without erythema or exudates.  Neck: Supple, trachea midline. No thyromegaly present. No cervical or supraclavicular lymphadenopathy. No JVD or carotid bruits appreciated  Cardiovascular: Regular rate and rhythm.  Normal S1, S2 without murmur appreciated.  Chest: Effort normal. Clear to auscultation throughout. No adventitious sounds.  Right breast has a large 3-4 cm area of firmness and step-off laterally around the 10:00 region. The texture is different it is a little unclear whether this is a separate mass. No dimpling of the skin or thickening of the skin is appreciated. No axillary adenopathy is appreciated.  Extremities: No cyanosis, clubbing, erythema, nor edema.   Neurological: Alert and oriented x 3.   Psychiatric:  Behavior, mood, and affect are appropriate.       Assessment and Plan:     22 y.o. female with the following issues:    1. Mass of right breast  US-BREAST COMPLETE-RIGHT    MA-MAMMO DIAGNOSTIC UNILAT W/CASSIDY W/CAD RIGHT         Followup: No Follow-up on file.

## 2018-03-26 ENCOUNTER — HOSPITAL ENCOUNTER (OUTPATIENT)
Dept: RADIOLOGY | Facility: MEDICAL CENTER | Age: 23
End: 2018-03-26
Attending: FAMILY MEDICINE
Payer: COMMERCIAL

## 2018-03-26 DIAGNOSIS — N63.10 MASS OF RIGHT BREAST: ICD-10-CM

## 2018-03-26 PROCEDURE — 76642 ULTRASOUND BREAST LIMITED: CPT | Mod: RT

## 2018-07-12 ENCOUNTER — HOSPITAL ENCOUNTER (EMERGENCY)
Facility: MEDICAL CENTER | Age: 23
End: 2018-07-13
Attending: EMERGENCY MEDICINE
Payer: COMMERCIAL

## 2018-07-12 DIAGNOSIS — O21.9 VOMITING OF PREGNANCY: ICD-10-CM

## 2018-07-12 PROCEDURE — 99284 EMERGENCY DEPT VISIT MOD MDM: CPT

## 2018-07-12 RX ORDER — ONDANSETRON 4 MG/1
4 TABLET, ORALLY DISINTEGRATING ORAL ONCE
Status: COMPLETED | OUTPATIENT
Start: 2018-07-13 | End: 2018-07-13

## 2018-07-12 ASSESSMENT — PAIN SCALES - GENERAL: PAINLEVEL_OUTOF10: 0

## 2018-07-13 VITALS
BODY MASS INDEX: 23.49 KG/M2 | SYSTOLIC BLOOD PRESSURE: 108 MMHG | HEART RATE: 62 BPM | WEIGHT: 146.16 LBS | OXYGEN SATURATION: 99 % | RESPIRATION RATE: 18 BRPM | HEIGHT: 66 IN | TEMPERATURE: 98.2 F | DIASTOLIC BLOOD PRESSURE: 68 MMHG

## 2018-07-13 LAB
ALBUMIN SERPL BCP-MCNC: 4.5 G/DL (ref 3.2–4.9)
ALBUMIN/GLOB SERPL: 1.9 G/DL
ALP SERPL-CCNC: 48 U/L (ref 30–99)
ALT SERPL-CCNC: 9 U/L (ref 2–50)
ANION GAP SERPL CALC-SCNC: 12 MMOL/L (ref 0–11.9)
APPEARANCE UR: CLEAR
AST SERPL-CCNC: 14 U/L (ref 12–45)
BACTERIA #/AREA URNS HPF: ABNORMAL /HPF
BASOPHILS # BLD AUTO: 0.3 % (ref 0–1.8)
BASOPHILS # BLD: 0.04 K/UL (ref 0–0.12)
BILIRUB SERPL-MCNC: 0.9 MG/DL (ref 0.1–1.5)
BILIRUB UR QL STRIP.AUTO: NEGATIVE
BUN SERPL-MCNC: 10 MG/DL (ref 8–22)
CALCIUM SERPL-MCNC: 9.7 MG/DL (ref 8.5–10.5)
CHLORIDE SERPL-SCNC: 107 MMOL/L (ref 96–112)
CO2 SERPL-SCNC: 20 MMOL/L (ref 20–33)
COLOR UR: YELLOW
CREAT SERPL-MCNC: 0.47 MG/DL (ref 0.5–1.4)
EOSINOPHIL # BLD AUTO: 0.01 K/UL (ref 0–0.51)
EOSINOPHIL NFR BLD: 0.1 % (ref 0–6.9)
EPI CELLS #/AREA URNS HPF: ABNORMAL /HPF
ERYTHROCYTE [DISTWIDTH] IN BLOOD BY AUTOMATED COUNT: 38.8 FL (ref 35.9–50)
GLOBULIN SER CALC-MCNC: 2.4 G/DL (ref 1.9–3.5)
GLUCOSE SERPL-MCNC: 89 MG/DL (ref 65–99)
GLUCOSE UR STRIP.AUTO-MCNC: NEGATIVE MG/DL
HCG UR QL: POSITIVE
HCT VFR BLD AUTO: 41.9 % (ref 37–47)
HGB BLD-MCNC: 14.5 G/DL (ref 12–16)
HYALINE CASTS #/AREA URNS LPF: ABNORMAL /LPF
IMM GRANULOCYTES # BLD AUTO: 0.05 K/UL (ref 0–0.11)
IMM GRANULOCYTES NFR BLD AUTO: 0.4 % (ref 0–0.9)
KETONES UR STRIP.AUTO-MCNC: >=160 MG/DL
LEUKOCYTE ESTERASE UR QL STRIP.AUTO: NEGATIVE
LYMPHOCYTES # BLD AUTO: 1.92 K/UL (ref 1–4.8)
LYMPHOCYTES NFR BLD: 15 % (ref 22–41)
MCH RBC QN AUTO: 28.2 PG (ref 27–33)
MCHC RBC AUTO-ENTMCNC: 34.6 G/DL (ref 33.6–35)
MCV RBC AUTO: 81.5 FL (ref 81.4–97.8)
MICRO URNS: ABNORMAL
MONOCYTES # BLD AUTO: 0.66 K/UL (ref 0–0.85)
MONOCYTES NFR BLD AUTO: 5.2 % (ref 0–13.4)
NEUTROPHILS # BLD AUTO: 10.08 K/UL (ref 2–7.15)
NEUTROPHILS NFR BLD: 79 % (ref 44–72)
NITRITE UR QL STRIP.AUTO: NEGATIVE
NRBC # BLD AUTO: 0 K/UL
NRBC BLD-RTO: 0 /100 WBC
PH UR STRIP.AUTO: 6 [PH]
PLATELET # BLD AUTO: 244 K/UL (ref 164–446)
PMV BLD AUTO: 10.2 FL (ref 9–12.9)
POTASSIUM SERPL-SCNC: 3.6 MMOL/L (ref 3.6–5.5)
PROT SERPL-MCNC: 6.9 G/DL (ref 6–8.2)
PROT UR QL STRIP: 30 MG/DL
RBC # BLD AUTO: 5.14 M/UL (ref 4.2–5.4)
RBC # URNS HPF: ABNORMAL /HPF
RBC UR QL AUTO: NEGATIVE
SODIUM SERPL-SCNC: 139 MMOL/L (ref 135–145)
SP GR UR REFRACTOMETRY: >=1.03
SP GR UR STRIP.AUTO: 1.04
UROBILINOGEN UR STRIP.AUTO-MCNC: 0.2 MG/DL
WBC # BLD AUTO: 12.8 K/UL (ref 4.8–10.8)
WBC #/AREA URNS HPF: ABNORMAL /HPF

## 2018-07-13 PROCEDURE — 81001 URINALYSIS AUTO W/SCOPE: CPT

## 2018-07-13 PROCEDURE — 700111 HCHG RX REV CODE 636 W/ 250 OVERRIDE (IP): Performed by: EMERGENCY MEDICINE

## 2018-07-13 PROCEDURE — 81025 URINE PREGNANCY TEST: CPT

## 2018-07-13 PROCEDURE — 80053 COMPREHEN METABOLIC PANEL: CPT

## 2018-07-13 PROCEDURE — 36415 COLL VENOUS BLD VENIPUNCTURE: CPT

## 2018-07-13 PROCEDURE — 85025 COMPLETE CBC W/AUTO DIFF WBC: CPT

## 2018-07-13 RX ORDER — SODIUM CHLORIDE 9 MG/ML
1000 INJECTION, SOLUTION INTRAVENOUS ONCE
Status: DISCONTINUED | OUTPATIENT
Start: 2018-07-13 | End: 2018-07-13 | Stop reason: HOSPADM

## 2018-07-13 RX ORDER — DOXYLAMINE SUCCINATE AND PYRIDOXINE HYDROCHLORIDE, DELAYED RELEASE TABLETS 10 MG/10 MG 10; 10 MG/1; MG/1
1 TABLET, DELAYED RELEASE ORAL 4 TIMES DAILY PRN
Qty: 60 TAB | Refills: 0 | Status: ON HOLD | OUTPATIENT
Start: 2018-07-13 | End: 2019-02-15

## 2018-07-13 RX ADMIN — ONDANSETRON 4 MG: 4 TABLET, ORALLY DISINTEGRATING ORAL at 00:11

## 2018-07-13 NOTE — ED TRIAGE NOTES
"Chief Complaint   Patient presents with   • Pregnancy     Pt guesses she is about 10 wks pregnant, could not get appt until 7/30.    • N/V     Since 5 am has had n/v, reports unable to keep anything down. Called OBGYN and was instructed to come to ED.      BP (!) 91/68   Pulse 93   Temp 36.8 °C (98.2 °F)   Resp 18   Ht 1.676 m (5' 6\")   Wt 66.3 kg (146 lb 2.6 oz)   SpO2 99%   BMI 23.59 kg/m²     Pt ambulatory to triage for above, steady on feet. Received emesis bag at check in. Pt returned to [facility], educated on triage process, instructed to notify staff of worsening symptoms/concerns.   "

## 2018-07-13 NOTE — ED PROVIDER NOTES
ED Provider Note    ED Provider Note    Primary care provider: Alex Golden M.D.  Means of arrival: walk in  History obtained from: Patient    CHIEF COMPLAINT  Chief Complaint   Patient presents with   • Pregnancy     Pt guesses she is about 10 wks pregnant, could not get appt until .    • N/V     Since 5 am has had n/v, reports unable to keep anything down. Called OBGYN and was instructed to come to ED.      Seen at 12:27 AM.   HPI  Rai Carrero is a 22 y.o. female who presents to the Emergency Department with intractable nausea and vomiting.  She has not had any significant p.o. intake for the past 24 hours.  She is approximately 10 weeks pregnant by LMP.  She is a .  She has not had any prenatal care, her next follow-up appointment for her OB/GYN is at the end of this month.    She notes some mild lightheadedness.  Otherwise she is without complaints.  She does not have any vaginal bleeding, pelvic pain, abdominal pain, back pain, dysuria, hematuria, chest pain, shortness of breath, fevers or chills.  She did have hyperemesis gravidarum with her last pregnancy.    REVIEW OF SYSTEMS  See HPI,   Remainder of ROS negative.     PAST MEDICAL HISTORY   has a past medical history of Headache, classical migraine.    SURGICAL HISTORY   has a past surgical history that includes tonsillectomy and adenoidectomy (2009).    SOCIAL HISTORY  Social History   Substance Use Topics   • Smoking status: Never Smoker   • Smokeless tobacco: Never Used   • Alcohol use Yes      Comment: occasional      History   Drug Use   • Types: Marijuana     Comment: marijuana daily       FAMILY HISTORY  Family History   Problem Relation Age of Onset   • Stroke Paternal Grandfather 63   • Heart Disease Paternal Grandfather    • Stroke Mother      post traumatic brain bleed       CURRENT MEDICATIONS  Reviewed.  See Encounter Summary.     ALLERGIES  No Known Allergies    PHYSICAL EXAM  VITAL SIGNS: /68   Pulse 62   Temp 36.8  "°C (98.2 °F)   Resp 18   Ht 1.676 m (5' 6\")   Wt 66.3 kg (146 lb 2.6 oz)   SpO2 99%   BMI 23.59 kg/m²   Constitutional: Awake, alert in no apparent distress.  Pleasant well-appearing female.  HENT: Normocephalic, Bilateral external ears normal. Nose normal.  Mildly dry mucosal membranes.  Eyes: Conjunctiva normal, non-icteric, EOMI.    Thorax & Lungs: Easy unlabored respirations, Clear to ascultation bilaterally.  Cardiovascular: Regular rate, Regular rhythm, No murmurs, rubs or gallops.  Abdomen:  Soft, nontender, nondistended, normal active bowel sounds.   :    Skin: Visualized skin is  Dry, No erythema, No rash.   Musculoskeletal:   No cyanosis, clubbing or edema.  Neurologic: Alert, Grossly non-focal.   Psychiatric: Normal affect, Normal mood  Lymphatic:  No cervical LAD    COURSE & MEDICAL DECISION MAKING  Pertinent Labs & Imaging studies reviewed. (See chart for details)        12:27 AM - Medical record reviewed, patient seen and examined at bedside.    12:35 AM  IV fluids will be administered due to clinical signs of dehydration/tachycardia/hypotension/possible sepsis.      Following IV fluids the patient feels significant improvement.  Moist mucosal membranes are observed.      Decision Making:  This is a 22 y.o. year old female who presents with intractable vomiting secondary to pregnancy.  She does not have any abdominal pain, I am not concerned about ectopic pregnancy, appendicitis or cholecystitis.  The abdominal exam is quite benign.  She is hemodynamically stable.  She had labs that are reassuring.  She is indeed pregnant.  Urinalysis is unremarkable.  She had hyperemesis gravidarum with her last pregnancy and is well aware of the treatment.     she is rehydrated now and back to baseline, she was tolerating p.o. upon discharge.  I had prescribed some dike leeches.  I recommend she follow-up with her primary care physician or obstetrician for additional management.  She should return for any " vaginal bleeding, abdominal pain, intractable vomiting or any other concern.    Discharge Medications:  Discharge Medication List as of 7/13/2018  1:23 AM      START taking these medications    Details   Doxylamine-Pyridoxine (DICLEGIS) 10-10 MG Tablet Delayed Response delayed-release tablet Take 1 tablet by mouth 4 times a day as needed., Disp-60 Tab, R-0, Normal             The patient was discharged home (see d/c instructions) and parent was told to return immediately for any signs or symptoms listed, or any worsening at all.  The patient's parent verbally agreed to the discharge precautions and follow-up plan which is documented in EPIC.    The patient is discharged home in good condition.    FINAL IMPRESSION  1. Vomiting of pregnancy

## 2018-07-13 NOTE — DISCHARGE INSTRUCTIONS
Eating Plan for Hyperemesis Gravidarum  Severe cases of hyperemesis gravidarum can lead to dehydration and malnutrition. The hyperemesis eating plan is one way to lessen the symptoms of nausea and vomiting. It is often used with prescribed medicines to control your symptoms.   WHAT CAN I DO TO RELIEVE MY SYMPTOMS?  Listen to your body. Everyone is different and has different preferences. Find what works best for you. Some of the following things may help:  · Eat and drink slowly.  · Eat 5-6 small meals daily instead of 3 large meals.    · Eat crackers before you get out of bed in the morning.    · Starchy foods are usually well tolerated (such as cereal, toast, bread, potatoes, pasta, rice, and pretzels).    · Reva may help with nausea. Add ¼ tsp ground reva to hot tea or choose reva tea.    · Try drinking 100% fruit juice or an electrolyte drink.  · Continue to take your prenatal vitamins as directed by your health care provider. If you are having trouble taking your prenatal vitamins, talk with your health care provider about different options.  · Include at least 1 serving of protein with your meals and snacks (such as meats or poultry, beans, nuts, eggs, or yogurt). Try eating a protein-rich snack before bed (such as cheese and crackers or a half turkey or peanut butter sandwich).  WHAT THINGS SHOULD I AVOID TO REDUCE MY SYMPTOMS?  The following things may help reduce your symptoms:  · Avoid foods with strong smells. Try eating meals in well-ventilated areas that are free of odors.  · Avoid drinking water or other beverages with meals. Try not to drink anything less than 30 minutes before and after meals.  · Avoid drinking more than 1 cup of fluid at a time.  · Avoid fried or high-fat foods, such as butter and cream sauces.  · Avoid spicy foods.  · Avoid skipping meals the best you can. Nausea can be more intense on an empty stomach. If you cannot tolerate food at that time, do not force it. Try sucking on  ice chips or other frozen items and make up the calories later.  · Avoid lying down within 2 hours after eating.     This information is not intended to replace advice given to you by your health care provider. Make sure you discuss any questions you have with your health care provider.     Document Released: 10/14/2008 Document Revised: 12/23/2014 Document Reviewed: 10/22/2014  [employer] Interactive Patient Education ©2016 [employer]

## 2019-02-15 ENCOUNTER — APPOINTMENT (OUTPATIENT)
Dept: OBGYN | Facility: MEDICAL CENTER | Age: 24
End: 2019-02-15
Attending: OBSTETRICS & GYNECOLOGY
Payer: COMMERCIAL

## 2019-02-15 ENCOUNTER — HOSPITAL ENCOUNTER (INPATIENT)
Facility: MEDICAL CENTER | Age: 24
LOS: 1 days | End: 2019-02-16
Attending: OBSTETRICS & GYNECOLOGY | Admitting: OBSTETRICS & GYNECOLOGY
Payer: COMMERCIAL

## 2019-02-15 DIAGNOSIS — R10.2 PELVIC AND PERINEAL PAIN: ICD-10-CM

## 2019-02-15 LAB
BASOPHILS # BLD AUTO: 0.7 % (ref 0–1.8)
BASOPHILS # BLD: 0.11 K/UL (ref 0–0.12)
EOSINOPHIL # BLD AUTO: 0.16 K/UL (ref 0–0.51)
EOSINOPHIL NFR BLD: 1 % (ref 0–6.9)
ERYTHROCYTE [DISTWIDTH] IN BLOOD BY AUTOMATED COUNT: 46.1 FL (ref 35.9–50)
HCT VFR BLD AUTO: 39.4 % (ref 37–47)
HGB BLD-MCNC: 13.5 G/DL (ref 12–16)
HOLDING TUBE BB 8507: NORMAL
IMM GRANULOCYTES # BLD AUTO: 0.23 K/UL (ref 0–0.11)
IMM GRANULOCYTES NFR BLD AUTO: 1.4 % (ref 0–0.9)
LYMPHOCYTES # BLD AUTO: 3.84 K/UL (ref 1–4.8)
LYMPHOCYTES NFR BLD: 24.1 % (ref 22–41)
MCH RBC QN AUTO: 29.5 PG (ref 27–33)
MCHC RBC AUTO-ENTMCNC: 34.3 G/DL (ref 33.6–35)
MCV RBC AUTO: 86 FL (ref 81.4–97.8)
MONOCYTES # BLD AUTO: 1.23 K/UL (ref 0–0.85)
MONOCYTES NFR BLD AUTO: 7.7 % (ref 0–13.4)
NEUTROPHILS # BLD AUTO: 10.39 K/UL (ref 2–7.15)
NEUTROPHILS NFR BLD: 65.1 % (ref 44–72)
NRBC # BLD AUTO: 0 K/UL
NRBC BLD-RTO: 0 /100 WBC
PLATELET # BLD AUTO: 210 K/UL (ref 164–446)
PMV BLD AUTO: 10.9 FL (ref 9–12.9)
RBC # BLD AUTO: 4.58 M/UL (ref 4.2–5.4)
WBC # BLD AUTO: 16 K/UL (ref 4.8–10.8)

## 2019-02-15 PROCEDURE — 700105 HCHG RX REV CODE 258: Performed by: OBSTETRICS & GYNECOLOGY

## 2019-02-15 PROCEDURE — 303615 HCHG EPIDURAL/SPINAL ANESTHESIA FOR LABOR

## 2019-02-15 PROCEDURE — 700111 HCHG RX REV CODE 636 W/ 250 OVERRIDE (IP)

## 2019-02-15 PROCEDURE — 304965 HCHG RECOVERY SERVICES

## 2019-02-15 PROCEDURE — 85025 COMPLETE CBC W/AUTO DIFF WBC: CPT

## 2019-02-15 PROCEDURE — 700111 HCHG RX REV CODE 636 W/ 250 OVERRIDE (IP): Performed by: OBSTETRICS & GYNECOLOGY

## 2019-02-15 PROCEDURE — 3E033VJ INTRODUCTION OF OTHER HORMONE INTO PERIPHERAL VEIN, PERCUTANEOUS APPROACH: ICD-10-PCS | Performed by: OBSTETRICS & GYNECOLOGY

## 2019-02-15 PROCEDURE — 10H07YZ INSERTION OF OTHER DEVICE INTO PRODUCTS OF CONCEPTION, VIA NATURAL OR ARTIFICIAL OPENING: ICD-10-PCS | Performed by: OBSTETRICS & GYNECOLOGY

## 2019-02-15 PROCEDURE — 59409 OBSTETRICAL CARE: CPT

## 2019-02-15 PROCEDURE — 700101 HCHG RX REV CODE 250

## 2019-02-15 PROCEDURE — 770002 HCHG ROOM/CARE - OB PRIVATE (112)

## 2019-02-15 PROCEDURE — 700102 HCHG RX REV CODE 250 W/ 637 OVERRIDE(OP): Performed by: OBSTETRICS & GYNECOLOGY

## 2019-02-15 PROCEDURE — 10907ZC DRAINAGE OF AMNIOTIC FLUID, THERAPEUTIC FROM PRODUCTS OF CONCEPTION, VIA NATURAL OR ARTIFICIAL OPENING: ICD-10-PCS | Performed by: OBSTETRICS & GYNECOLOGY

## 2019-02-15 PROCEDURE — 0HQ9XZZ REPAIR PERINEUM SKIN, EXTERNAL APPROACH: ICD-10-PCS | Performed by: OBSTETRICS & GYNECOLOGY

## 2019-02-15 PROCEDURE — A9270 NON-COVERED ITEM OR SERVICE: HCPCS | Performed by: OBSTETRICS & GYNECOLOGY

## 2019-02-15 RX ORDER — ROPIVACAINE HYDROCHLORIDE 2 MG/ML
INJECTION, SOLUTION EPIDURAL; INFILTRATION; PERINEURAL CONTINUOUS
Status: DISCONTINUED | OUTPATIENT
Start: 2019-02-15 | End: 2019-02-16 | Stop reason: HOSPADM

## 2019-02-15 RX ORDER — ROPIVACAINE HYDROCHLORIDE 2 MG/ML
INJECTION, SOLUTION EPIDURAL; INFILTRATION; PERINEURAL
Status: COMPLETED
Start: 2019-02-15 | End: 2019-02-15

## 2019-02-15 RX ORDER — ONDANSETRON 2 MG/ML
4 INJECTION INTRAMUSCULAR; INTRAVENOUS EVERY 6 HOURS PRN
Status: DISCONTINUED | OUTPATIENT
Start: 2019-02-15 | End: 2019-02-16 | Stop reason: HOSPADM

## 2019-02-15 RX ORDER — SODIUM CHLORIDE, SODIUM LACTATE, POTASSIUM CHLORIDE, CALCIUM CHLORIDE 600; 310; 30; 20 MG/100ML; MG/100ML; MG/100ML; MG/100ML
INJECTION, SOLUTION INTRAVENOUS CONTINUOUS
Status: DISCONTINUED | OUTPATIENT
Start: 2019-02-15 | End: 2019-02-16 | Stop reason: HOSPADM

## 2019-02-15 RX ORDER — SODIUM CHLORIDE, SODIUM LACTATE, POTASSIUM CHLORIDE, AND CALCIUM CHLORIDE .6; .31; .03; .02 G/100ML; G/100ML; G/100ML; G/100ML
250 INJECTION, SOLUTION INTRAVENOUS PRN
Status: DISCONTINUED | OUTPATIENT
Start: 2019-02-15 | End: 2019-02-15 | Stop reason: HOSPADM

## 2019-02-15 RX ORDER — SODIUM CHLORIDE, SODIUM LACTATE, POTASSIUM CHLORIDE, AND CALCIUM CHLORIDE .6; .31; .03; .02 G/100ML; G/100ML; G/100ML; G/100ML
1000 INJECTION, SOLUTION INTRAVENOUS
Status: DISCONTINUED | OUTPATIENT
Start: 2019-02-15 | End: 2019-02-15 | Stop reason: HOSPADM

## 2019-02-15 RX ORDER — MISOPROSTOL 200 UG/1
600 TABLET ORAL
Status: DISCONTINUED | OUTPATIENT
Start: 2019-02-15 | End: 2019-02-16 | Stop reason: HOSPADM

## 2019-02-15 RX ORDER — DOCUSATE SODIUM 100 MG/1
100 CAPSULE, LIQUID FILLED ORAL 2 TIMES DAILY PRN
Status: DISCONTINUED | OUTPATIENT
Start: 2019-02-15 | End: 2019-02-16 | Stop reason: HOSPADM

## 2019-02-15 RX ORDER — SODIUM CHLORIDE, SODIUM LACTATE, POTASSIUM CHLORIDE, CALCIUM CHLORIDE 600; 310; 30; 20 MG/100ML; MG/100ML; MG/100ML; MG/100ML
INJECTION, SOLUTION INTRAVENOUS PRN
Status: DISCONTINUED | OUTPATIENT
Start: 2019-02-15 | End: 2019-02-16 | Stop reason: HOSPADM

## 2019-02-15 RX ORDER — OXYCODONE HYDROCHLORIDE AND ACETAMINOPHEN 5; 325 MG/1; MG/1
1 TABLET ORAL EVERY 4 HOURS PRN
Status: DISCONTINUED | OUTPATIENT
Start: 2019-02-15 | End: 2019-02-16 | Stop reason: HOSPADM

## 2019-02-15 RX ORDER — ONDANSETRON 4 MG/1
4 TABLET, ORALLY DISINTEGRATING ORAL EVERY 6 HOURS PRN
Status: DISCONTINUED | OUTPATIENT
Start: 2019-02-15 | End: 2019-02-16 | Stop reason: HOSPADM

## 2019-02-15 RX ORDER — MISOPROSTOL 200 UG/1
800 TABLET ORAL
Status: DISCONTINUED | OUTPATIENT
Start: 2019-02-15 | End: 2019-02-15 | Stop reason: HOSPADM

## 2019-02-15 RX ORDER — VITAMIN A ACETATE, BETA CAROTENE, ASCORBIC ACID, CHOLECALCIFEROL, .ALPHA.-TOCOPHEROL ACETATE, DL-, THIAMINE MONONITRATE, RIBOFLAVIN, NIACINAMIDE, PYRIDOXINE HYDROCHLORIDE, FOLIC ACID, CYANOCOBALAMIN, CALCIUM CARBONATE, FERROUS FUMARATE, ZINC OXIDE, CUPRIC OXIDE 3080; 12; 120; 400; 1; 1.84; 3; 20; 22; 920; 25; 200; 27; 10; 2 [IU]/1; UG/1; MG/1; [IU]/1; MG/1; MG/1; MG/1; MG/1; MG/1; [IU]/1; MG/1; MG/1; MG/1; MG/1; MG/1
1 TABLET, FILM COATED ORAL EVERY MORNING
Status: DISCONTINUED | OUTPATIENT
Start: 2019-02-16 | End: 2019-02-16 | Stop reason: HOSPADM

## 2019-02-15 RX ORDER — IBUPROFEN 600 MG/1
600 TABLET ORAL EVERY 6 HOURS PRN
Status: DISCONTINUED | OUTPATIENT
Start: 2019-02-15 | End: 2019-02-16 | Stop reason: HOSPADM

## 2019-02-15 RX ADMIN — IBUPROFEN 600 MG: 600 TABLET ORAL at 16:58

## 2019-02-15 RX ADMIN — Medication 125 ML/HR: at 16:58

## 2019-02-15 RX ADMIN — ROPIVACAINE HYDROCHLORIDE 100 ML: 2 INJECTION, SOLUTION EPIDURAL; INFILTRATION at 11:33

## 2019-02-15 RX ADMIN — SODIUM CHLORIDE, POTASSIUM CHLORIDE, SODIUM LACTATE AND CALCIUM CHLORIDE: 600; 310; 30; 20 INJECTION, SOLUTION INTRAVENOUS at 14:50

## 2019-02-15 RX ADMIN — OXYCODONE AND ACETAMINOPHEN 1 TABLET: 5; 325 TABLET ORAL at 22:46

## 2019-02-15 RX ADMIN — SODIUM CHLORIDE, POTASSIUM CHLORIDE, SODIUM LACTATE AND CALCIUM CHLORIDE: 600; 310; 30; 20 INJECTION, SOLUTION INTRAVENOUS at 08:00

## 2019-02-15 RX ADMIN — SODIUM CHLORIDE, POTASSIUM CHLORIDE, SODIUM LACTATE AND CALCIUM CHLORIDE: 600; 310; 30; 20 INJECTION, SOLUTION INTRAVENOUS at 10:57

## 2019-02-15 RX ADMIN — Medication 1 MILLI-UNITS/MIN: at 08:00

## 2019-02-15 RX ADMIN — ROPIVACAINE HYDROCHLORIDE 100 ML: 2 INJECTION, SOLUTION EPIDURAL; INFILTRATION; PERINEURAL at 11:33

## 2019-02-15 ASSESSMENT — PATIENT HEALTH QUESTIONNAIRE - PHQ9
1. LITTLE INTEREST OR PLEASURE IN DOING THINGS: NOT AT ALL
SUM OF ALL RESPONSES TO PHQ9 QUESTIONS 1 AND 2: 0
2. FEELING DOWN, DEPRESSED, IRRITABLE, OR HOPELESS: NOT AT ALL
1. LITTLE INTEREST OR PLEASURE IN DOING THINGS: NOT AT ALL
SUM OF ALL RESPONSES TO PHQ9 QUESTIONS 1 AND 2: 0
2. FEELING DOWN, DEPRESSED, IRRITABLE, OR HOPELESS: NOT AT ALL

## 2019-02-15 ASSESSMENT — LIFESTYLE VARIABLES: EVER_SMOKED: YES

## 2019-02-15 NOTE — CARE PLAN
Problem: Pain  Goal: Alleviation of Pain or a reduction in pain to the patient's comfort goal  Outcome: PROGRESSING AS EXPECTED  Pain POC discussed. Pt denies pain at this time, desires epidural when more uncomfortable.    Problem: Risk for Infection, Impaired Wound Healing  Goal: Remain free from signs and symptoms of infection  Outcome: PROGRESSING AS EXPECTED  Pt afebrile, no s/s of infection

## 2019-02-15 NOTE — H&P
DATE OF ADMISSION:  02/15/2019    CHIEF COMPLAINT:  Here for induction of labor.    HISTORY OF PRESENT ILLNESS:  Patient is a 23-year-old  3, para 1 female   due on , admitted at 39-1/2 weeks' gestation for induction of labor.    She has had no maternal risk factors.  Her baby has a horseshoe kidney.  She   is group B strep negative.    PAST OBSTETRICAL HISTORY:  The patient has had a previous term vaginal   delivery to an uncomplicated pregnancy with 8 pound 5 ounce male infant.    MEDICAL HISTORY:  She is in good health with no significant medical illnesses.    SURGERIES:  Include tonsillectomy.    ALLERGIES:  None known.    SOCIAL HISTORY:  She is .  Does not smoke.    REVIEW OF SYSTEMS:  She has had no recent vaginal bleeding, leaking of fluid   or abdominal pain.    PHYSICAL EXAMINATION:  VITAL SIGNS:  She is afebrile with blood pressure 110/70.    GENERAL APPEARANCE:  She is healthy appearing, in no distress.  LUNGS:  Clear to auscultation.  HEART:  Regular rate and rhythm.  ABDOMEN:  Estimated fetal weight is 8 pounds.  Fetal heart tones are present.  GYNECOLOGIC:  Cervix was 2 cm, -2, 70% effaced.    IMPRESSION  Term pregnancy with favorable cervix, desires delivery.    PLAN:  The patient will undergo Pitocin induction of labor with amniotomy,   anticipate vaginal delivery.  We will use epidural for pain if needed.       ____________________________________     MD CORBY RAYMOND / [facility]    DD:  02/15/2019 07:49:43  DT:  02/15/2019 09:05:22    D#:  [id number]  Job#:  [id number]

## 2019-02-15 NOTE — PROGRESS NOTES
AROM clear fluid, large amount of cervical bleeding  Cx 4cm/-2/70%  IIUPC and FSE placed  On pitocin  Watch for progress

## 2019-02-15 NOTE — PROGRESS NOTES
0535-Pt presents today for scheduled IOL. Pt denies UC's, VB or LOF and confirms +FM. TOCO and EFM in place. VS taken, stable. POC discussed, all questions answered. FOB at bedside.  0545-SVE 2/70/-3  0700-Report given to DANAY Quezada RN

## 2019-02-16 VITALS
OXYGEN SATURATION: 96 % | RESPIRATION RATE: 18 BRPM | HEIGHT: 65 IN | TEMPERATURE: 97.9 F | HEART RATE: 83 BPM | SYSTOLIC BLOOD PRESSURE: 113 MMHG | BODY MASS INDEX: 31.49 KG/M2 | WEIGHT: 189 LBS | DIASTOLIC BLOOD PRESSURE: 63 MMHG

## 2019-02-16 LAB
ERYTHROCYTE [DISTWIDTH] IN BLOOD BY AUTOMATED COUNT: 46.4 FL (ref 35.9–50)
HCT VFR BLD AUTO: 34.2 % (ref 37–47)
HGB BLD-MCNC: 11.5 G/DL (ref 12–16)
MCH RBC QN AUTO: 29.3 PG (ref 27–33)
MCHC RBC AUTO-ENTMCNC: 33.6 G/DL (ref 33.6–35)
MCV RBC AUTO: 87 FL (ref 81.4–97.8)
PLATELET # BLD AUTO: 184 K/UL (ref 164–446)
PMV BLD AUTO: 11.1 FL (ref 9–12.9)
RBC # BLD AUTO: 3.93 M/UL (ref 4.2–5.4)
WBC # BLD AUTO: 20.9 K/UL (ref 4.8–10.8)

## 2019-02-16 PROCEDURE — 85027 COMPLETE CBC AUTOMATED: CPT

## 2019-02-16 PROCEDURE — A9270 NON-COVERED ITEM OR SERVICE: HCPCS | Performed by: OBSTETRICS & GYNECOLOGY

## 2019-02-16 PROCEDURE — 36415 COLL VENOUS BLD VENIPUNCTURE: CPT

## 2019-02-16 PROCEDURE — 700112 HCHG RX REV CODE 229: Performed by: OBSTETRICS & GYNECOLOGY

## 2019-02-16 PROCEDURE — 700102 HCHG RX REV CODE 250 W/ 637 OVERRIDE(OP): Performed by: OBSTETRICS & GYNECOLOGY

## 2019-02-16 RX ORDER — OXYCODONE HYDROCHLORIDE AND ACETAMINOPHEN 5; 325 MG/1; MG/1
1 TABLET ORAL EVERY 4 HOURS PRN
Qty: 15 TAB | Refills: 0 | Status: SHIPPED | OUTPATIENT
Start: 2019-02-16 | End: 2019-02-21

## 2019-02-16 RX ORDER — IBUPROFEN 600 MG/1
600 TABLET ORAL EVERY 6 HOURS PRN
Qty: 30 TAB | Refills: 1 | Status: SHIPPED | OUTPATIENT
Start: 2019-02-16 | End: 2020-07-17

## 2019-02-16 RX ADMIN — Medication 1 TABLET: at 06:05

## 2019-02-16 RX ADMIN — IBUPROFEN 600 MG: 600 TABLET ORAL at 06:10

## 2019-02-16 RX ADMIN — IBUPROFEN 600 MG: 600 TABLET ORAL at 14:56

## 2019-02-16 RX ADMIN — OXYCODONE AND ACETAMINOPHEN 1 TABLET: 5; 325 TABLET ORAL at 14:56

## 2019-02-16 RX ADMIN — OXYCODONE AND ACETAMINOPHEN 1 TABLET: 5; 325 TABLET ORAL at 06:05

## 2019-02-16 RX ADMIN — DOCUSATE SODIUM 100 MG: 100 CAPSULE, LIQUID FILLED ORAL at 06:05

## 2019-02-16 ASSESSMENT — PATIENT HEALTH QUESTIONNAIRE - PHQ9
SUM OF ALL RESPONSES TO PHQ9 QUESTIONS 1 AND 2: 0
1. LITTLE INTEREST OR PLEASURE IN DOING THINGS: NOT AT ALL
2. FEELING DOWN, DEPRESSED, IRRITABLE, OR HOPELESS: NOT AT ALL

## 2019-02-16 NOTE — CARE PLAN
Problem: Pain  Goal: Alleviation of Pain or a reduction in pain to the patient's comfort goal    Intervention: Pain Management - Epidural/Spinal  Epidural placed for pain management to meet pt's comfort goal.      Problem: Risk for Infection, Impaired Wound Healing  Goal: Remain free from signs and symptoms of infection    Intervention: Limit vaginal exams as necessary  Vaginal exams limited to prevent risk of infection.

## 2019-02-16 NOTE — PROGRESS NOTES
PP#1    Feels well and would like to go home if baby is discharged    VS afebrile  abd fundus firm, not tender  Gyn light flow  Hct 34.2    Stable post partum    Will plan for discharge if baby able to go  Rx percoset and ibuprofen  Follow up in 6 weeks

## 2019-02-16 NOTE — LACTATION NOTE
Mother states BF is going well, states she BF older child without problem and had a full milk supply, denies pain and/or need for assistance with BF at this time, encouraged Q 2-3 hour feedings (more often if feeding cues noted), discussed assistance available at [facility] and invited to BF [place],  encouraged to call for assistance as needed.

## 2019-02-16 NOTE — PROGRESS NOTES
Pt arrived to room 313 via wheelchair infant in arms. Report received from DEJA Maldonado. Bands verified. Cuddles tag on and flashing. IV patent running second bag of pitocin at 125 cc/hr. Patient with complaints of some pain but would like to try a heat pack first. Will call if needing pain medication. Patient oriented to unit and room. Call light and emergency call light discussed. Patient to call with any needs and or concerns.

## 2019-02-16 NOTE — DISCHARGE PLANNING
Discharge Planning Assessment Post Partum    Reason for Referral: History of THC.  Baby's UDS is positive.  Address: [address] [place] [address]. [address] [place] [address]  Phone: [phone]  Type of Living Situation: living with FOB and son  Mom Diagnosis: Pregnancy  Baby Diagnosis:   Primary Language: English    Name of Baby: Toshia Fox (: 2/[birth year]/19)  Father of the Baby: Mason Fox (: 78)  Involved in baby’s care? Yes  Contact Information: [phone]  FOB is employed at [employer]    Prenatal Care: Yes  Mom's PCP: Dr. Alex Golden  PCP for new baby: Dr. Street    Support System: FOB  Coping/Bonding between mother & baby: Yes  Source of Feeding: breast feeding  Supplies for Infant: Prepared for infant    Mom's Insurance: [employer]  Baby Covered on Insurance:Yes  Mother Employed/School: Yes, at [employer]  Other children in the home/names & ages: Sidney Fox (17)    Financial Hardship/Income: denies  Mom's Mental status: alert and oriented  Services used prior to admit: [facility]    CPS History: Report made on 18 to Olga Block with [facility].  Report is information only.  Psychiatric History: denies  Domestic Violence History: denies  Drug/ETOH History: Yes, infant is positive for marijuana.    Resources Provided: children and family resource list, counseling resources for post partum depression  Referrals Made: diaper bank referral provided     Clearance for Discharge: Infant is cleared to discharge home with MOB.

## 2019-02-16 NOTE — PROGRESS NOTES
0700: Report received from NOC RN, POC discussed. Pt sitting up in bed with infant, denies needs at this time.  0800: Assessment complete. VSS. Reports minimal pain, 1/10. Lochia light. Ambulating well. Possible discharge this afternoon. Awaiting kidney u/s for infant.   1550: Report given to DEJA Pino. POC discussed.

## 2019-02-16 NOTE — PROGRESS NOTES
1850-report received from DANAY Quezada RN. Pt resting comfortably in bed. FOB at bedside. POC discussed.   1950-Pt up to restroom, voided, pericare provided, gown changed  2004-Pt transferred to  via wheelchair. Report given to Blair SHORT.

## 2019-02-16 NOTE — DISCHARGE INSTRUCTIONS
POSTPARTUM DISCHARGE INSTRUCTIONS FOR MOM    YOB: 1995   Age: 23 y.o.               Admit Date: 2/15/2019     Discharge Date: 2019  Attending Doctor:  Aly Cano M.D.                  Allergies:  Patient has no known allergies.    Discharged to home by car. Discharged via wheelchair, hospital escort: Refused.  Special equipment needed: Not Applicable  Belongings with: Personal  Be sure to schedule a follow-up appointment with your primary care doctor or any specialists as instructed.     Discharge Plan:   Diet Plan: Discussed  Activity Level: Discussed  Confirmed Follow up Appointment: Patient to Call and Schedule Appointment  Confirmed Symptoms Management: Discussed  Medication Reconciliation Updated: Yes  Influenza Vaccine Indication: Not indicated: Previously immunized this influenza season and > 8 years of age    REASONS TO CALL YOUR OBSTETRICIAN:  1.   Persistent fever or shaking chills (Temperature higher than 100.4)  2.   Heavy bleeding (soaking more than 1 pad per hour); Passing clots  3.   Foul odor from vagina  4.   Mastitis (Breast infection; breast pain, chills, fever, redness)  5.   Urinary pain, burning or frequency  6.   Episiotomy infection  7.   Abdominal incision infection  8.   Severe depression longer than 24 hours    HAND WASHING  · Prior to handling the baby.  · Before breastfeeding or bottle feeding baby.  · After using the bathroom or changing the baby's diaper.    WOUND CARE  Ask your physician for additional care instructions.  In general:    ·  Incision:      · Keep clean and dry.    · Do NOT lift anything heavier than your baby for up to 6 weeks.    · There should not be any opening or pus.      VAGINAL CARE  · Nothing inside vagina for 6 weeks: no sexual intercourse, tampons or douching.  · Bleeding may continue for 2-4 weeks.  Amount may vary.    · Call your physician for heavy bleeding which means soaking more than 1 pad per hour    BIRTH CONTROL  · It is  "possible to become pregnant at any time after delivery and while breastfeeding.  · Plan to discuss a method of birth control with your physician at your follow up visit. visit.    DIET AND ELIMINATION  · Eating more fiber (bran cereal, fruits, and vegetables) and drinking plenty of fluids will help to avoid constipation.  · Urinary frequency after childbirth is normal.    POSTPARTUM BLUES  During the first few days after birth, you may experience a sense of the \"blues\" which may include impatience, irritability or even crying.  These feeling come and go quickly.  However, as many as 1 in 10 women experience emotional symptoms known as postpartum depression.    Postpartum depression:  May start as early as the second or third day after delivery or take several weeks or months to develop.  Symptoms of \"blues\" are present, but are more intense:  Crying spells; loss of appetite; feelings of hopelessness or loss of control; fear of touching the baby; over concern or no concern at all about the baby; little or no concern about your own appearance/caring for yourself; and/or inability to sleep or excessive sleeping.  Contact your physician if you are experiencing any of these symptoms.    Crisis Hotline:  · [place] Crisis Hotline:  7-879-ECGZOVB  Or 1-256.463.6788  · [place] Crisis Hotline:  1-691.833.4465  Or 565-866-6107    PREVENTING SHAKEN BABY:  If you are angry or stressed, PUT THE BABY IN THE CRIB, step away, take some deep breaths, and wait until you are calm to care for the baby.  DO NOT SHAKE THE BABY.  You are not alone, call a supporter for help.    · Crisis Call Center 24/7 crisis line 601-042-3063 or 1-824.458.7482  · You can also text them, text \"ANSWER\" to 500513    QUIT SMOKING/TOBACCO USE:  I understand the use of any tobacco products increases my chance of suffering from future heart disease and could cause other illnesses which may shorten my life. Quitting the use of tobacco products is the single most " important thing I can do to improve my health. For further information on smoking / tobacco cessation call a Toll Free Quit Line at 1-741.845.1294 (*National [employer] [place]) or 1-269.918.3572 (American Lung Association) or you can access the web based program at www.lungusa.org.    · [place] Tobacco Users Help Line:  (504) 161-9549       Toll Free: 1-959.277.5468  · Quit Tobacco Program [facility] [place] (742)003-8492    DEPRESSION / SUICIDE RISK:  As you are discharged from this [facility], it is important to learn how to keep safe from harming yourself.    Recognize the warning signs:  · Abrupt changes in personality, positive or negative- including increase in energy   · Giving away possessions  · Change in eating patterns- significant weight changes-  positive or negative  · Change in sleeping patterns- unable to sleep or sleeping all the time   · Unwillingness or inability to communicate  · Depression  · Unusual sadness, discouragement and loneliness  · Talk of wanting to die  · Neglect of personal appearance   · Rebelliousness- reckless behavior  · Withdrawal from people/activities they love  · Confusion- inability to concentrate     If you or a loved one observes any of these behaviors or has concerns about self-harm, here's what you can do:  · Talk about it- your feelings and reasons for harming yourself  · Remove any means that you might use to hurt yourself (examples: pills, rope, extension cords, firearm)  · Get professional help from the community (Mental Health, Substance Abuse, psychological counseling)  · Do not be alone:Call your Safe Contact- someone whom you trust who will be there for you.  · Call your local CRISIS HOTLINE 316-9592 or 563-051-7999  · Call your local Children's Mobile Crisis Response Team [place] (553) 699-3329 or www.payworks  · Call the toll free National Suicide Prevention Hotlines   · National Suicide Prevention Lifeline 539-566-JLGM  (1639)  · [facility] 800-SUICIDE (404-3227)    DISCHARGE SURVEY:  Thank you for choosing [facility].  We hope we provided you with very good care.  You may be receiving a survey in the mail.  Please fill it out.  Your opinion is valuable to us.    ADDITIONAL EDUCATIONAL MATERIALS GIVEN TO PATIENT:        My signature on this form indicates that:  1.  I have reviewed and understand the above information  2.  My questions regarding this information have been answered to my satisfaction.  3.  I have formulated a plan with my discharge nurse to obtain my prescribed medication for home.

## 2019-02-16 NOTE — CARE PLAN
Problem: Altered physiologic condition related to immediate post-delivery state and potential for bleeding/hemorrhage  Goal: Patient physiologically stable as evidenced by normal lochia, palpable uterine involution and vital signs within normal limits  Outcome: PROGRESSING AS EXPECTED  Fundus firm with light lochia. Patient educated on when to pull emergency call light.     Problem: Alteration in comfort related to episiotomy, vaginal repair and/or after birth pains  Goal: Patient is able to ambulate, care for self and infant  Outcome: PROGRESSING AS EXPECTED  Patient requesting pain mediations as needed. Tucks and spray given for perineal discomfort.

## 2019-02-17 NOTE — PROGRESS NOTES
1500- Discharge education discussed with patient.  Patient verbalized understanding.  Prescriptions handed to patient.  Patient verbalized understanding of follow up appointments for herself and infant.

## 2019-03-06 NOTE — L&D DELIVERY NOTE
DATE OF SERVICE:  02/15/2019    HISTORY:  Patient is a 23-year-old  2, para 1 female, admitted at   39-1/2 weeks' gestation with a favorable cervix for elective induction of   labor.  She was 4 cm dilated when her membranes were ruptured for clear fluid   at 9:30 in the morning.  Intrauterine pressure monitor was placed to monitor   contraction strength and she subsequently had an epidural anesthetic for pain   relief.  She made good progress in labor reaching complete dilation by 1:15   p.m.  She pushed 10 minutes, delivering spontaneously and vaginally a healthy   female infant, Toshia, Apgar 8 and 9, weighing 8 pounds 3 ounces.  The baby   was placed on the maternal abdomen to allow delayed cord clamping.  There was   a first-degree perineal injury repaired with 3-0 chromic.  Placenta delivered   spontaneously and intact within the next 5-10 minutes with estimated blood   loss 400 mL.  There were no complications.    The patient had fetal diagnosis of horseshoe kidney.  The baby appeared stable   and normal in the delivery room and will be evaluated further by her   pediatrician.       ____________________________________     MD CORBY RAYMOND / NEGRO    DD:  2019 08:29:54  DT:  2019 09:34:02    D#:  [id number]  Job#:  [id number]

## 2020-07-17 ENCOUNTER — OFFICE VISIT (OUTPATIENT)
Dept: MEDICAL GROUP | Facility: MEDICAL CENTER | Age: 25
End: 2020-07-17
Payer: COMMERCIAL

## 2020-07-17 VITALS
HEIGHT: 66 IN | RESPIRATION RATE: 16 BRPM | SYSTOLIC BLOOD PRESSURE: 104 MMHG | OXYGEN SATURATION: 100 % | WEIGHT: 120 LBS | DIASTOLIC BLOOD PRESSURE: 90 MMHG | HEART RATE: 90 BPM | TEMPERATURE: 98.6 F | BODY MASS INDEX: 19.29 KG/M2

## 2020-07-17 DIAGNOSIS — R63.4 UNINTENDED WEIGHT LOSS: ICD-10-CM

## 2020-07-17 DIAGNOSIS — D64.9 ANEMIA, UNSPECIFIED TYPE: ICD-10-CM

## 2020-07-17 DIAGNOSIS — Z83.79 FAMILY HISTORY OF CELIAC DISEASE: ICD-10-CM

## 2020-07-17 DIAGNOSIS — Z13.220 SCREENING CHOLESTEROL LEVEL: ICD-10-CM

## 2020-07-17 DIAGNOSIS — R19.7 DIARRHEA, UNSPECIFIED TYPE: ICD-10-CM

## 2020-07-17 DIAGNOSIS — F32.5 MAJOR DEPRESSIVE DISORDER WITH SINGLE EPISODE, IN REMISSION (HCC): ICD-10-CM

## 2020-07-17 PROCEDURE — 99214 OFFICE O/P EST MOD 30 MIN: CPT | Performed by: FAMILY MEDICINE

## 2020-07-17 RX ORDER — ESCITALOPRAM OXALATE 10 MG/1
10 TABLET ORAL DAILY
Qty: 30 TAB | Refills: 6 | Status: SHIPPED | OUTPATIENT
Start: 2020-07-17 | End: 2022-09-13

## 2020-07-17 ASSESSMENT — PATIENT HEALTH QUESTIONNAIRE - PHQ9
5. POOR APPETITE OR OVEREATING: 3 - NEARLY EVERY DAY
CLINICAL INTERPRETATION OF PHQ2 SCORE: 6
SUM OF ALL RESPONSES TO PHQ QUESTIONS 1-9: 11

## 2020-07-17 NOTE — PROGRESS NOTES
Chief Complaint   Patient presents with   • Weight Loss   • Anemia   • Depression       Subjective:     HPI:   Rai MILLER presents today with the followin. Diarrhea, unspecified type/Family history of celiac disease/Unintended weight loss  Patient has been dealing with abdominal pain, bloating and diarrhea for a long time.  Her grandmother who has celiac suggested that she try avoiding gluten for a while.  She found that her intestinal problems were very much better had the rash on her upper arms had gone away and she felt her general wellbeing and emotional health was better.  However, she started eating some gluten again and felt very poorly.  She would like to have a test for celiac.  Discussed that the testing is most accurate if she has been taking gluten-containing foods for at least a week beforehand.  She voices acknowledgment.  Lab orders are placed.  Denies any visible blood in the stool.    2. Screening cholesterol level  Patient is due for screening cholesterol level, order discussed and placed    3. Anemia, unspecified type  Lab orders are discussed and placed.  Needs follow-up testing.  Her last CBC showing anemia was a couple years back with the delivery of her child.  This was likely simply due to that but needs a recheck.    4. Major depressive disorder with single episode, in remission (HCC)  Patient does have history of depression and anxiety.  She is dealing with the ramifications of being adult child of alcoholic mother and father.  She has started to write out many of her experiences to try to deal with some of the sadness and frustration and depression.  She has good insight.  Discussed including low-dose escitalopram in her regimen.  I have sent that prescription to the pharmacy.  She will consider taking it.  Warnings regarding SSRIs discussed.        Patient Active Problem List    Diagnosis Date Noted   • Family history of celiac disease 2020   • Anemia 2020   •  "Major depressive disorder with single episode, in remission ([facility]) 07/17/2020   • Vaginal delivery 02/16/2019   • Mass of right breast 02/14/2018       Current medicines (including changes today)  Current Outpatient Medications   Medication Sig Dispense Refill   • Prenatal Vit-Fe Fumarate-FA (M-VIT) Tab Take 1 Tab by mouth every day. 30 Tab 11   • escitalopram (LEXAPRO) 10 MG Tab Take 1 Tab by mouth every day. 30 Tab 6     No current facility-administered medications for this visit.        No Known Allergies    ROS: As per HPI       Objective:     /90   Pulse 90   Temp 37 °C (98.6 °F)   Resp 16   Ht 1.664 m (5' 5.5\")   Wt 54.4 kg (120 lb)   SpO2 100%  Body mass index is 19.67 kg/m².    Physical Exam:  Constitutional: Well-developed and well-nourished. Not diaphoretic. No distress. Lucid and fluent.  Patient, physician, student and staff all wearing masks.  Skin: Skin is warm and dry. No rash noted.  Head: Atraumatic without lesions.  Eyes: Conjunctivae and extraocular motions are normal. Pupils are equal, round, and reactive to light. No scleral icterus.   Neck: Supple, trachea midline. No thyromegaly present. No cervical or supraclavicular lymphadenopathy. No JVD or carotid bruits appreciated  Cardiovascular: Regular rate and rhythm.  Normal S1, S2 without murmur appreciated.  Chest: Effort normal. Clear to auscultation throughout. No adventitious sounds.   Abdomen: Soft, non tender, and without distention. Active bowel sounds in all four quadrants. No rebound, guarding, masses or hepatosplenomegaly.  Extremities: No cyanosis, clubbing, erythema, nor edema.   Neurological: Alert and oriented x 3.  No tremor appreciated.  Psychiatric:  Behavior, mood, and affect are appropriate.    Depression Screening    Little interest or pleasure in doing things?  3 - nearly every day   Feeling down, depressed , or hopeless? 3 - nearly every day   Trouble falling or staying asleep, or sleeping too much?  0 - not at all "   Feeling tired or having little energy?  2 - more than half the days   Poor appetite or overeating?  3 - nearly every day   Feeling bad about yourself - or that you are a failure or have let yourself or your family down? 0 - not at all   Trouble concentrating on things, such as reading the newspaper or watching television? 0 - not at all   Moving or speaking so slowly that other people could have noticed.  Or the opposite - being so fidgety or restless that you have been moving around a lot more than usual?  0 - not at all   Thoughts that you would be better off dead, or of hurting yourself?  0 - not at all   Patient Health Questionnaire Score: 11       If depressive symptoms identified deferred to follow up visit unless specifically addressed in assesment and plan.    Interpretation of PHQ-9 Total Score   Score Severity   1-4 No Depression   5-9 Mild Depression   10-14 Moderate Depression   15-19 Moderately Severe Depression   20-27 Severe Depression         Assessment and Plan:     24 y.o. female with the following issues:    1. Diarrhea, unspecified type  CELIAC DISEASE AB PANEL    Comp Metabolic Panel    CBC WITHOUT DIFFERENTIAL   2. Family history of celiac disease  CELIAC DISEASE AB PANEL   3. Unintended weight loss  Comp Metabolic Panel    CBC WITHOUT DIFFERENTIAL    TSH   4. Screening cholesterol level  Lipid Profile   5. Anemia, unspecified type  CBC WITHOUT DIFFERENTIAL   6. Major depressive disorder with single episode, in remission (HCC)  Patient has been identified as having a positive depression screening. Appropriate orders and counseling have been given.    escitalopram (LEXAPRO) 10 MG Tab         Followup: Return in about 6 weeks (around 8/28/2020), or if symptoms worsen or fail to improve.

## 2020-07-18 ENCOUNTER — HOSPITAL ENCOUNTER (OUTPATIENT)
Dept: LAB | Facility: MEDICAL CENTER | Age: 25
End: 2020-07-18
Attending: FAMILY MEDICINE
Payer: COMMERCIAL

## 2020-07-18 DIAGNOSIS — R19.7 DIARRHEA, UNSPECIFIED TYPE: ICD-10-CM

## 2020-07-18 DIAGNOSIS — Z13.220 SCREENING CHOLESTEROL LEVEL: ICD-10-CM

## 2020-07-18 DIAGNOSIS — Z83.79 FAMILY HISTORY OF CELIAC DISEASE: ICD-10-CM

## 2020-07-18 DIAGNOSIS — R63.4 UNINTENDED WEIGHT LOSS: ICD-10-CM

## 2020-07-18 DIAGNOSIS — D64.9 ANEMIA, UNSPECIFIED TYPE: ICD-10-CM

## 2020-07-18 LAB
ALBUMIN SERPL BCP-MCNC: 4.7 G/DL (ref 3.2–4.9)
ALBUMIN/GLOB SERPL: 2.1 G/DL
ALP SERPL-CCNC: 46 U/L (ref 30–99)
ALT SERPL-CCNC: 11 U/L (ref 2–50)
ANION GAP SERPL CALC-SCNC: 12 MMOL/L (ref 7–16)
AST SERPL-CCNC: 14 U/L (ref 12–45)
BILIRUB SERPL-MCNC: 0.6 MG/DL (ref 0.1–1.5)
BUN SERPL-MCNC: 15 MG/DL (ref 8–22)
CALCIUM SERPL-MCNC: 9.4 MG/DL (ref 8.5–10.5)
CHLORIDE SERPL-SCNC: 105 MMOL/L (ref 96–112)
CHOLEST SERPL-MCNC: 127 MG/DL (ref 100–199)
CO2 SERPL-SCNC: 24 MMOL/L (ref 20–33)
CREAT SERPL-MCNC: 0.63 MG/DL (ref 0.5–1.4)
ERYTHROCYTE [DISTWIDTH] IN BLOOD BY AUTOMATED COUNT: 40.6 FL (ref 35.9–50)
FASTING STATUS PATIENT QL REPORTED: NORMAL
GLOBULIN SER CALC-MCNC: 2.2 G/DL (ref 1.9–3.5)
GLUCOSE SERPL-MCNC: 86 MG/DL (ref 65–99)
HCT VFR BLD AUTO: 46.4 % (ref 37–47)
HDLC SERPL-MCNC: 62 MG/DL
HGB BLD-MCNC: 15.9 G/DL (ref 12–16)
LDLC SERPL CALC-MCNC: 57 MG/DL
MCH RBC QN AUTO: 28.8 PG (ref 27–33)
MCHC RBC AUTO-ENTMCNC: 34.3 G/DL (ref 33.6–35)
MCV RBC AUTO: 83.9 FL (ref 81.4–97.8)
PLATELET # BLD AUTO: 267 K/UL (ref 164–446)
PMV BLD AUTO: 10.3 FL (ref 9–12.9)
POTASSIUM SERPL-SCNC: 4.3 MMOL/L (ref 3.6–5.5)
PROT SERPL-MCNC: 6.9 G/DL (ref 6–8.2)
RBC # BLD AUTO: 5.53 M/UL (ref 4.2–5.4)
SODIUM SERPL-SCNC: 141 MMOL/L (ref 135–145)
TRIGL SERPL-MCNC: 39 MG/DL (ref 0–149)
TSH SERPL DL<=0.005 MIU/L-ACNC: 1.22 UIU/ML (ref 0.38–5.33)
WBC # BLD AUTO: 9.1 K/UL (ref 4.8–10.8)

## 2020-07-18 PROCEDURE — 85027 COMPLETE CBC AUTOMATED: CPT

## 2020-07-18 PROCEDURE — 80061 LIPID PANEL: CPT

## 2020-07-18 PROCEDURE — 80053 COMPREHEN METABOLIC PANEL: CPT

## 2020-07-18 PROCEDURE — 83516 IMMUNOASSAY NONANTIBODY: CPT

## 2020-07-18 PROCEDURE — 36415 COLL VENOUS BLD VENIPUNCTURE: CPT

## 2020-07-18 PROCEDURE — 82784 ASSAY IGA/IGD/IGG/IGM EACH: CPT

## 2020-07-18 PROCEDURE — 84443 ASSAY THYROID STIM HORMONE: CPT

## 2020-07-20 LAB — IGA SERPL-MCNC: 104 MG/DL (ref 68–408)

## 2020-07-21 LAB — TTG IGA SER IA-ACNC: <2 U/ML (ref 0–3)

## 2022-09-13 ENCOUNTER — OFFICE VISIT (OUTPATIENT)
Dept: MEDICAL GROUP | Facility: MEDICAL CENTER | Age: 27
End: 2022-09-13
Payer: COMMERCIAL

## 2022-09-13 VITALS
HEART RATE: 107 BPM | TEMPERATURE: 98.3 F | DIASTOLIC BLOOD PRESSURE: 54 MMHG | SYSTOLIC BLOOD PRESSURE: 126 MMHG | WEIGHT: 142.8 LBS | HEIGHT: 66 IN | BODY MASS INDEX: 22.95 KG/M2 | RESPIRATION RATE: 14 BRPM | OXYGEN SATURATION: 96 %

## 2022-09-13 DIAGNOSIS — Z00.00 ROUTINE GENERAL MEDICAL EXAMINATION AT A HEALTH CARE FACILITY: ICD-10-CM

## 2022-09-13 DIAGNOSIS — Z00.00 LABORATORY EXAMINATION ORDERED AS PART OF A ROUTINE GENERAL MEDICAL EXAMINATION: ICD-10-CM

## 2022-09-13 PROBLEM — N63.10 MASS OF RIGHT BREAST: Status: RESOLVED | Noted: 2018-02-14 | Resolved: 2022-09-13

## 2022-09-13 PROCEDURE — 99395 PREV VISIT EST AGE 18-39: CPT | Performed by: FAMILY MEDICINE

## 2022-09-13 ASSESSMENT — PATIENT HEALTH QUESTIONNAIRE - PHQ9
6. FEELING BAD ABOUT YOURSELF - OR THAT YOU ARE A FAILURE OR HAVE LET YOURSELF OR YOUR FAMILY DOWN: NOT AL ALL
3. TROUBLE FALLING OR STAYING ASLEEP OR SLEEPING TOO MUCH: NOT AT ALL
5. POOR APPETITE OR OVEREATING: NOT AT ALL
4. FEELING TIRED OR HAVING LITTLE ENERGY: NOT AT ALL
7. TROUBLE CONCENTRATING ON THINGS, SUCH AS READING THE NEWSPAPER OR WATCHING TELEVISION: NOT AT ALL
2. FEELING DOWN, DEPRESSED, IRRITABLE, OR HOPELESS: NOT AT ALL
9. THOUGHTS THAT YOU WOULD BE BETTER OFF DEAD, OR OF HURTING YOURSELF: NOT AT ALL
8. MOVING OR SPEAKING SO SLOWLY THAT OTHER PEOPLE COULD HAVE NOTICED. OR THE OPPOSITE, BEING SO FIGETY OR RESTLESS THAT YOU HAVE BEEN MOVING AROUND A LOT MORE THAN USUAL: NOT AT ALL
SUM OF ALL RESPONSES TO PHQ9 QUESTIONS 1 AND 2: 0
1. LITTLE INTEREST OR PLEASURE IN DOING THINGS: NOT AT ALL
SUM OF ALL RESPONSES TO PHQ QUESTIONS 1-9: 0

## 2022-09-13 ASSESSMENT — ENCOUNTER SYMPTOMS
BLOOD IN STOOL: 0
WHEEZING: 0
SORE THROAT: 0
DIZZINESS: 0
ABDOMINAL PAIN: 0
COUGH: 0
ROS GI COMMENTS: SOME LOOSE STOOL
BACK PAIN: 0
CHILLS: 0
NERVOUS/ANXIOUS: 1
PALPITATIONS: 0
HEADACHES: 0
DIARRHEA: 0
VOMITING: 0
MYALGIAS: 0
NECK PAIN: 0
DEPRESSION: 0
SHORTNESS OF BREATH: 0
FOCAL WEAKNESS: 0
SPEECH CHANGE: 0
TREMORS: 0
SEIZURES: 0
NAUSEA: 0
INSOMNIA: 0
BRUISES/BLEEDS EASILY: 0
ORTHOPNEA: 0
FEVER: 0
DIAPHORESIS: 0
HALLUCINATIONS: 0
SPUTUM PRODUCTION: 0
LOSS OF CONSCIOUSNESS: 0
BLURRED VISION: 0
SENSORY CHANGE: 0
WEAKNESS: 0
TINGLING: 0
DOUBLE VISION: 0
HEARTBURN: 0
WEIGHT LOSS: 0

## 2022-09-13 ASSESSMENT — LIFESTYLE VARIABLES: SUBSTANCE_ABUSE: 0

## 2022-09-13 NOTE — PROGRESS NOTES
Subjective     Rai MILLER is a 26 y.o. female who presents with Annual Exam        She is here for her annual examination    HPI     has a past medical history of Headache, classical migraine.   has a past surgical history that includes tonsillectomy and adenoidectomy (4/7/2009).   has a past surgical history that includes tonsillectomy and adenoidectomy (4/7/2009).  family history includes Cancer (age of onset: 72) in her maternal grandfather; Heart Disease in her paternal grandfather; Other in her maternal grandmother; Stroke in her mother; Stroke (age of onset: 63) in her paternal grandfather.   reports that she has never smoked. She has never used smokeless tobacco. She reports that she does not currently use alcohol. She reports current drug use. Drugs: Marijuana and Inhaled.    No current outpatient medications on file.  has No Known Allergies.    Review of Systems   Constitutional:  Negative for chills, diaphoresis, fever, malaise/fatigue and weight loss.   HENT:  Negative for congestion, hearing loss, sore throat and tinnitus.    Eyes:  Negative for blurred vision and double vision.   Respiratory:  Negative for cough, sputum production, shortness of breath and wheezing.    Cardiovascular:  Negative for chest pain, palpitations, orthopnea and leg swelling.        Some chest wall pain   Gastrointestinal:  Negative for abdominal pain, blood in stool, diarrhea, heartburn, nausea and vomiting.        Some loose stool   Genitourinary:  Negative for dysuria, frequency, hematuria and urgency.   Musculoskeletal:  Negative for back pain, joint pain, myalgias and neck pain.        Some chest wall pain   Skin:  Negative for rash.   Neurological:  Negative for dizziness, tingling, tremors, sensory change, speech change, focal weakness, seizures, loss of consciousness, weakness and headaches.   Endo/Heme/Allergies:  Negative for environmental allergies. Does not bruise/bleed easily.   Psychiatric/Behavioral:   "Negative for depression, hallucinations, substance abuse and suicidal ideas. The patient is nervous/anxious. The patient does not have insomnia.        House is being tested for mold      Objective     /54 (BP Location: Right arm, Patient Position: Sitting, BP Cuff Size: Small adult)   Pulse (!) 107   Temp 36.8 °C (98.3 °F) (Temporal)   Resp 14   Ht 1.664 m (5' 5.5\")   Wt 64.8 kg (142 lb 12.8 oz)   SpO2 96%   BMI 23.40 kg/m²      Physical Exam  Vitals reviewed.   Constitutional:       Appearance: She is well-developed.   HENT:      Head: Normocephalic and atraumatic.   Eyes:      General:         Left eye: No discharge.      Pupils: Pupils are equal, round, and reactive to light.   Neck:      Thyroid: No thyromegaly.      Vascular: No JVD.   Cardiovascular:      Rate and Rhythm: Normal rate and regular rhythm.      Heart sounds: Normal heart sounds. No murmur heard.  Pulmonary:      Effort: Pulmonary effort is normal. No respiratory distress.      Breath sounds: Normal breath sounds. No wheezing or rales.   Abdominal:      General: Bowel sounds are normal. There is no distension.      Palpations: Abdomen is soft. There is no mass.      Tenderness: There is no abdominal tenderness.   Musculoskeletal:         General: Normal range of motion.      Cervical back: Normal range of motion and neck supple.   Lymphadenopathy:      Cervical: No cervical adenopathy.   Skin:     General: Skin is warm and dry.      Findings: No erythema or rash.   Neurological:      Mental Status: She is alert and oriented to person, place, and time.      Coordination: Coordination normal.   Psychiatric:         Mood and Affect: Mood normal.         Behavior: Behavior normal.         Assessment & Plan        1. Routine general medical examination at a health care facility  She is generally well on this examination    2. Laboratory examination ordered as part of a routine general medical examination  Routine orders discussed and " placed  - Comp Metabolic Panel; Future  - Lipid Profile; Future  - TSH; Future  - CBC WITHOUT DIFFERENTIAL; Future        Recheck one year, sooner as needed

## 2022-09-14 ENCOUNTER — HOSPITAL ENCOUNTER (OUTPATIENT)
Dept: LAB | Facility: MEDICAL CENTER | Age: 27
End: 2022-09-14
Attending: FAMILY MEDICINE
Payer: COMMERCIAL

## 2022-09-14 DIAGNOSIS — Z00.00 LABORATORY EXAMINATION ORDERED AS PART OF A ROUTINE GENERAL MEDICAL EXAMINATION: ICD-10-CM

## 2022-09-14 LAB
ALBUMIN SERPL BCP-MCNC: 4.2 G/DL (ref 3.2–4.9)
ALBUMIN/GLOB SERPL: 1.9 G/DL
ALP SERPL-CCNC: 43 U/L (ref 30–99)
ALT SERPL-CCNC: 8 U/L (ref 2–50)
ANION GAP SERPL CALC-SCNC: 10 MMOL/L (ref 7–16)
AST SERPL-CCNC: 12 U/L (ref 12–45)
BILIRUB SERPL-MCNC: 0.7 MG/DL (ref 0.1–1.5)
BUN SERPL-MCNC: 9 MG/DL (ref 8–22)
CALCIUM SERPL-MCNC: 9.4 MG/DL (ref 8.5–10.5)
CHLORIDE SERPL-SCNC: 107 MMOL/L (ref 96–112)
CHOLEST SERPL-MCNC: 111 MG/DL (ref 100–199)
CO2 SERPL-SCNC: 25 MMOL/L (ref 20–33)
CREAT SERPL-MCNC: 0.57 MG/DL (ref 0.5–1.4)
ERYTHROCYTE [DISTWIDTH] IN BLOOD BY AUTOMATED COUNT: 43 FL (ref 35.9–50)
FASTING STATUS PATIENT QL REPORTED: NORMAL
GFR SERPLBLD CREATININE-BSD FMLA CKD-EPI: 126 ML/MIN/1.73 M 2
GLOBULIN SER CALC-MCNC: 2.2 G/DL (ref 1.9–3.5)
GLUCOSE SERPL-MCNC: 94 MG/DL (ref 65–99)
HCT VFR BLD AUTO: 41.3 % (ref 37–47)
HDLC SERPL-MCNC: 54 MG/DL
HGB BLD-MCNC: 14.1 G/DL (ref 12–16)
LDLC SERPL CALC-MCNC: 48 MG/DL
MCH RBC QN AUTO: 28.4 PG (ref 27–33)
MCHC RBC AUTO-ENTMCNC: 34.1 G/DL (ref 33.6–35)
MCV RBC AUTO: 83.3 FL (ref 81.4–97.8)
PLATELET # BLD AUTO: 239 K/UL (ref 164–446)
PMV BLD AUTO: 11.1 FL (ref 9–12.9)
POTASSIUM SERPL-SCNC: 4.3 MMOL/L (ref 3.6–5.5)
PROT SERPL-MCNC: 6.4 G/DL (ref 6–8.2)
RBC # BLD AUTO: 4.96 M/UL (ref 4.2–5.4)
SODIUM SERPL-SCNC: 142 MMOL/L (ref 135–145)
TRIGL SERPL-MCNC: 47 MG/DL (ref 0–149)
TSH SERPL DL<=0.005 MIU/L-ACNC: 0.45 UIU/ML (ref 0.38–5.33)
WBC # BLD AUTO: 8 K/UL (ref 4.8–10.8)

## 2022-09-14 PROCEDURE — 80061 LIPID PANEL: CPT

## 2022-09-14 PROCEDURE — 84443 ASSAY THYROID STIM HORMONE: CPT

## 2022-09-14 PROCEDURE — 36415 COLL VENOUS BLD VENIPUNCTURE: CPT

## 2022-09-14 PROCEDURE — 80053 COMPREHEN METABOLIC PANEL: CPT

## 2022-09-14 PROCEDURE — 85027 COMPLETE CBC AUTOMATED: CPT

## 2025-04-11 ENCOUNTER — APPOINTMENT (OUTPATIENT)
Dept: MEDICAL GROUP | Facility: MEDICAL CENTER | Age: 30
End: 2025-04-11
Payer: COMMERCIAL

## 2025-04-11 ENCOUNTER — HOSPITAL ENCOUNTER (OUTPATIENT)
Facility: MEDICAL CENTER | Age: 30
End: 2025-04-11
Attending: FAMILY MEDICINE
Payer: COMMERCIAL

## 2025-04-11 VITALS
RESPIRATION RATE: 17 BRPM | DIASTOLIC BLOOD PRESSURE: 56 MMHG | HEART RATE: 72 BPM | SYSTOLIC BLOOD PRESSURE: 98 MMHG | WEIGHT: 145.6 LBS | HEIGHT: 64 IN | OXYGEN SATURATION: 99 % | TEMPERATURE: 98.4 F | BODY MASS INDEX: 24.86 KG/M2

## 2025-04-11 DIAGNOSIS — Z20.2 POTENTIAL EXPOSURE TO STD: ICD-10-CM

## 2025-04-11 DIAGNOSIS — Z01.419 WELL WOMAN EXAM WITH ROUTINE GYNECOLOGICAL EXAM: ICD-10-CM

## 2025-04-11 DIAGNOSIS — Z00.00 LABORATORY EXAMINATION ORDERED AS PART OF A ROUTINE GENERAL MEDICAL EXAMINATION: ICD-10-CM

## 2025-04-11 PROBLEM — D64.9 ANEMIA: Status: RESOLVED | Noted: 2020-07-17 | Resolved: 2025-04-11

## 2025-04-11 PROBLEM — F43.29 STRESS AND ADJUSTMENT REACTION: Status: ACTIVE | Noted: 2025-04-11

## 2025-04-11 PROCEDURE — 87624 HPV HI-RISK TYP POOLED RSLT: CPT

## 2025-04-11 PROCEDURE — 3078F DIAST BP <80 MM HG: CPT | Performed by: FAMILY MEDICINE

## 2025-04-11 PROCEDURE — 87491 CHLMYD TRACH DNA AMP PROBE: CPT

## 2025-04-11 PROCEDURE — 99395 PREV VISIT EST AGE 18-39: CPT | Performed by: FAMILY MEDICINE

## 2025-04-11 PROCEDURE — 88142 CYTOPATH C/V THIN LAYER: CPT

## 2025-04-11 PROCEDURE — 87591 N.GONORRHOEAE DNA AMP PROB: CPT

## 2025-04-11 PROCEDURE — 3074F SYST BP LT 130 MM HG: CPT | Performed by: FAMILY MEDICINE

## 2025-04-11 ASSESSMENT — PATIENT HEALTH QUESTIONNAIRE - PHQ9
5. POOR APPETITE OR OVEREATING: NOT AT ALL
SUM OF ALL RESPONSES TO PHQ QUESTIONS 1-9: 13
9. THOUGHTS THAT YOU WOULD BE BETTER OFF DEAD, OR OF HURTING YOURSELF: NOT AT ALL
6. FEELING BAD ABOUT YOURSELF - OR THAT YOU ARE A FAILURE OR HAVE LET YOURSELF OR YOUR FAMILY DOWN: NOT AL ALL
2. FEELING DOWN, DEPRESSED, IRRITABLE, OR HOPELESS: NEARLY EVERY DAY
SUM OF ALL RESPONSES TO PHQ9 QUESTIONS 1 AND 2: 3
8. MOVING OR SPEAKING SO SLOWLY THAT OTHER PEOPLE COULD HAVE NOTICED. OR THE OPPOSITE, BEING SO FIGETY OR RESTLESS THAT YOU HAVE BEEN MOVING AROUND A LOT MORE THAN USUAL: NEARLY EVERY DAY
7. TROUBLE CONCENTRATING ON THINGS, SUCH AS READING THE NEWSPAPER OR WATCHING TELEVISION: NEARLY EVERY DAY
4. FEELING TIRED OR HAVING LITTLE ENERGY: MORE THAN HALF THE DAYS
1. LITTLE INTEREST OR PLEASURE IN DOING THINGS: NOT AT ALL
3. TROUBLE FALLING OR STAYING ASLEEP OR SLEEPING TOO MUCH: MORE THAN HALF THE DAYS

## 2025-04-11 ASSESSMENT — ENCOUNTER SYMPTOMS
ABDOMINAL PAIN: 0
ORTHOPNEA: 0
NAUSEA: 0
SPUTUM PRODUCTION: 0
SORE THROAT: 0
FEVER: 0
DOUBLE VISION: 0
HALLUCINATIONS: 0
COUGH: 0
DIZZINESS: 0
TREMORS: 0
DEPRESSION: 0
TINGLING: 0
BRUISES/BLEEDS EASILY: 0
SENSORY CHANGE: 0
MYALGIAS: 0
WEAKNESS: 0
FOCAL WEAKNESS: 0
NERVOUS/ANXIOUS: 0
HEARTBURN: 0
WHEEZING: 0
WEIGHT LOSS: 0
HEADACHES: 1
INSOMNIA: 0
SPEECH CHANGE: 0
VOMITING: 0
PALPITATIONS: 0
NECK PAIN: 0
BLOOD IN STOOL: 0
BLURRED VISION: 0
SHORTNESS OF BREATH: 0
DIAPHORESIS: 0
MEMORY LOSS: 0
BACK PAIN: 0
CHILLS: 0
DIARRHEA: 0

## 2025-04-11 ASSESSMENT — LIFESTYLE VARIABLES: SUBSTANCE_ABUSE: 0

## 2025-04-11 NOTE — PROGRESS NOTES
Subjective     Rai MILLER is a 29 y.o. female who presents with Annual Exam        She is here for her well woman examination with pap smear.    HPI     has a past medical history of Headache, classical migraine and Stress and adjustment reaction (04/11/2025).   has a past surgical history that includes tonsillectomy and adenoidectomy (4/7/2009).  family history includes Cancer (age of onset: 72) in her maternal grandfather; Heart Disease in her paternal grandfather; Other in her maternal grandmother; Stroke in her mother; Stroke (age of onset: 63) in her paternal grandfather.   reports that she has never smoked. She has never used smokeless tobacco. She reports that she does not currently use alcohol. She reports current drug use. Drugs: Marijuana and Inhaled.    No current outpatient medications on file.  has no known allergies.    Review of Systems   Constitutional:  Negative for chills, diaphoresis, fever, malaise/fatigue and weight loss.   HENT:  Negative for congestion, hearing loss, sore throat and tinnitus.    Eyes:  Negative for blurred vision and double vision.   Respiratory:  Negative for cough, sputum production, shortness of breath and wheezing.    Cardiovascular:  Negative for chest pain, palpitations, orthopnea and leg swelling.        Occasional palpitations   Gastrointestinal:  Negative for abdominal pain, blood in stool, diarrhea, heartburn, nausea and vomiting.   Genitourinary:  Negative for dysuria, frequency, hematuria and urgency.   Musculoskeletal:  Negative for back pain, joint pain, myalgias and neck pain.   Skin:  Negative for rash.   Neurological:  Positive for headaches. Negative for dizziness, tingling, tremors, sensory change, speech change, focal weakness and weakness.        Occasional headaches   Endo/Heme/Allergies:  Positive for environmental allergies. Does not bruise/bleed easily.   Psychiatric/Behavioral:  Negative for depression, hallucinations, memory loss, substance  "abuse and suicidal ideas. The patient is not nervous/anxious and does not have insomnia.           Objective     BP 98/56 (BP Location: Right arm, Patient Position: Sitting, BP Cuff Size: Adult)   Pulse 72   Temp 36.9 °C (98.4 °F) (Temporal)   Resp 17   Ht 1.626 m (5' 4\")   Wt 66 kg (145 lb 9.6 oz)   LMP 03/15/2025 (Approximate)   SpO2 99%   Breastfeeding No   BMI 24.99 kg/m²      Physical Exam  Vitals reviewed.   Constitutional:       General: She is not in acute distress.     Appearance: She is well-developed.   HENT:      Head: Normocephalic and atraumatic.   Eyes:      General:         Left eye: No discharge.      Pupils: Pupils are equal, round, and reactive to light.   Neck:      Thyroid: No thyromegaly.      Vascular: No JVD.   Cardiovascular:      Rate and Rhythm: Normal rate and regular rhythm.      Heart sounds: Normal heart sounds. No murmur heard.  Pulmonary:      Effort: Pulmonary effort is normal. No respiratory distress.      Breath sounds: Normal breath sounds. No wheezing or rales.   Chest:   Breasts:     Breasts are symmetrical.      Right: No inverted nipple, mass, nipple discharge, skin change or tenderness.      Left: No inverted nipple, mass, nipple discharge, skin change or tenderness.   Abdominal:      General: Bowel sounds are normal. There is no distension.      Palpations: Abdomen is soft. There is no mass.      Tenderness: There is no abdominal tenderness.   Genitourinary:     Vagina: Normal. No vaginal discharge.      Cervix: No discharge.      Adnexa:         Right: No mass.          Left: No mass.        Comments: Pap smear (brush and spatula) of cervix taken and sent  Musculoskeletal:         General: Normal range of motion.      Cervical back: Normal range of motion and neck supple.   Lymphadenopathy:      Cervical: No cervical adenopathy.   Skin:     General: Skin is warm and dry.      Findings: No erythema or rash.   Neurological:      General: No focal deficit present.    "   Mental Status: She is alert and oriented to person, place, and time.      Coordination: Coordination normal.   Psychiatric:         Mood and Affect: Mood normal.         Behavior: Behavior normal.                Anticipatory guidance:  seatbelt worn.  Immunizations reviewed and discussed.  Mammogram at 38.  Colonoscopy 43.  TDaP due 2028.  Assessment & Plan  Well woman exam with routine gynecological exam  She is generally well.  Await pap result.  Orders:    THINPREP PAP W/HPV AND CTNG; Future    Laboratory examination ordered as part of a routine general medical examination  Routine orders discussed and placed  Orders:    Comp Metabolic Panel; Future    CBC WITHOUT DIFFERENTIAL; Future    TSH; Future    Lipid Profile; Future    Potential exposure to STD  No definite exposure, is in exclusive relationship but testing indicated  Orders:    HIV AG/AB Combo Assay Screening; Future    T.Pallidum AB KAVON (Screening); Future    Hepatitis C Virus Antibody; Future    HEP B Surface Antibody; Future    Hep B Surface Antigen; Future     Recheck one year, sooner as needed

## 2025-04-12 ENCOUNTER — HOSPITAL ENCOUNTER (OUTPATIENT)
Dept: LAB | Facility: MEDICAL CENTER | Age: 30
End: 2025-04-12
Attending: FAMILY MEDICINE
Payer: COMMERCIAL

## 2025-04-12 DIAGNOSIS — Z20.2 POTENTIAL EXPOSURE TO STD: ICD-10-CM

## 2025-04-12 DIAGNOSIS — Z00.00 LABORATORY EXAMINATION ORDERED AS PART OF A ROUTINE GENERAL MEDICAL EXAMINATION: ICD-10-CM

## 2025-04-12 LAB
ALBUMIN SERPL BCP-MCNC: 4.6 G/DL (ref 3.2–4.9)
ALBUMIN/GLOB SERPL: 1.8 G/DL
ALP SERPL-CCNC: 37 U/L (ref 30–99)
ALT SERPL-CCNC: 18 U/L (ref 2–50)
ANION GAP SERPL CALC-SCNC: 11 MMOL/L (ref 7–16)
AST SERPL-CCNC: 23 U/L (ref 12–45)
BILIRUB SERPL-MCNC: 0.7 MG/DL (ref 0.1–1.5)
BUN SERPL-MCNC: 16 MG/DL (ref 8–22)
C TRACH DNA GENITAL QL NAA+PROBE: NEGATIVE
CALCIUM ALBUM COR SERPL-MCNC: 9.2 MG/DL (ref 8.5–10.5)
CALCIUM SERPL-MCNC: 9.7 MG/DL (ref 8.5–10.5)
CHLORIDE SERPL-SCNC: 109 MMOL/L (ref 96–112)
CHOLEST SERPL-MCNC: 170 MG/DL (ref 100–199)
CO2 SERPL-SCNC: 22 MMOL/L (ref 20–33)
CREAT SERPL-MCNC: 0.66 MG/DL (ref 0.5–1.4)
ERYTHROCYTE [DISTWIDTH] IN BLOOD BY AUTOMATED COUNT: 43.7 FL (ref 35.9–50)
FASTING STATUS PATIENT QL REPORTED: NORMAL
GFR SERPLBLD CREATININE-BSD FMLA CKD-EPI: 120 ML/MIN/1.73 M 2
GLOBULIN SER CALC-MCNC: 2.6 G/DL (ref 1.9–3.5)
GLUCOSE SERPL-MCNC: 96 MG/DL (ref 65–99)
HBV SURFACE AB SERPL IA-ACNC: <3.5 MIU/ML (ref 0–10)
HBV SURFACE AG SER QL: NORMAL
HCT VFR BLD AUTO: 47.2 % (ref 37–47)
HCV AB SER QL: NORMAL
HDLC SERPL-MCNC: 74 MG/DL
HGB BLD-MCNC: 15.7 G/DL (ref 12–16)
HIV 1+2 AB+HIV1 P24 AG SERPL QL IA: NORMAL
LDLC SERPL CALC-MCNC: 86 MG/DL
MCH RBC QN AUTO: 28.6 PG (ref 27–33)
MCHC RBC AUTO-ENTMCNC: 33.3 G/DL (ref 32.2–35.5)
MCV RBC AUTO: 86.1 FL (ref 81.4–97.8)
N GONORRHOEA DNA GENITAL QL NAA+PROBE: NEGATIVE
PLATELET # BLD AUTO: 236 K/UL (ref 164–446)
PMV BLD AUTO: 10.6 FL (ref 9–12.9)
POTASSIUM SERPL-SCNC: 4.3 MMOL/L (ref 3.6–5.5)
PROT SERPL-MCNC: 7.2 G/DL (ref 6–8.2)
RBC # BLD AUTO: 5.48 M/UL (ref 4.2–5.4)
SODIUM SERPL-SCNC: 142 MMOL/L (ref 135–145)
SPECIMEN SOURCE: NORMAL
T PALLIDUM AB SER QL IA: NORMAL
TRIGL SERPL-MCNC: 48 MG/DL (ref 0–149)
TSH SERPL-ACNC: 0.69 UIU/ML (ref 0.38–5.33)
WBC # BLD AUTO: 6.7 K/UL (ref 4.8–10.8)

## 2025-04-12 PROCEDURE — 85027 COMPLETE CBC AUTOMATED: CPT

## 2025-04-12 PROCEDURE — 86803 HEPATITIS C AB TEST: CPT

## 2025-04-12 PROCEDURE — 87389 HIV-1 AG W/HIV-1&-2 AB AG IA: CPT

## 2025-04-12 PROCEDURE — 86780 TREPONEMA PALLIDUM: CPT

## 2025-04-12 PROCEDURE — 80061 LIPID PANEL: CPT

## 2025-04-12 PROCEDURE — 80053 COMPREHEN METABOLIC PANEL: CPT

## 2025-04-12 PROCEDURE — 87340 HEPATITIS B SURFACE AG IA: CPT

## 2025-04-12 PROCEDURE — 84443 ASSAY THYROID STIM HORMONE: CPT

## 2025-04-12 PROCEDURE — 86706 HEP B SURFACE ANTIBODY: CPT

## 2025-04-12 PROCEDURE — 36415 COLL VENOUS BLD VENIPUNCTURE: CPT

## 2025-04-14 ENCOUNTER — RESULTS FOLLOW-UP (OUTPATIENT)
Dept: MEDICAL GROUP | Facility: MEDICAL CENTER | Age: 30
End: 2025-04-14

## 2025-04-20 LAB
HPV I/H RISK 1 DNA SPEC QL NAA+PROBE: NOT DETECTED
SPECIMEN SOURCE: NORMAL
THINPREP PAP, CYTOLOGY NL11781: NORMAL

## 2025-04-21 ENCOUNTER — RESULTS FOLLOW-UP (OUTPATIENT)
Dept: MEDICAL GROUP | Facility: MEDICAL CENTER | Age: 30
End: 2025-04-21

## 2025-06-16 ENCOUNTER — OFFICE VISIT (OUTPATIENT)
Dept: MEDICAL GROUP | Facility: MEDICAL CENTER | Age: 30
End: 2025-06-16
Payer: COMMERCIAL

## 2025-06-16 VITALS
OXYGEN SATURATION: 98 % | WEIGHT: 138.45 LBS | HEART RATE: 83 BPM | HEIGHT: 64 IN | SYSTOLIC BLOOD PRESSURE: 94 MMHG | BODY MASS INDEX: 23.64 KG/M2 | TEMPERATURE: 99.6 F | DIASTOLIC BLOOD PRESSURE: 52 MMHG | RESPIRATION RATE: 14 BRPM

## 2025-06-16 DIAGNOSIS — J30.9 ALLERGIC RHINITIS, UNSPECIFIED SEASONALITY, UNSPECIFIED TRIGGER: Primary | ICD-10-CM

## 2025-06-16 DIAGNOSIS — H69.92 DYSFUNCTION OF LEFT EUSTACHIAN TUBE: ICD-10-CM

## 2025-06-16 PROCEDURE — 99213 OFFICE O/P EST LOW 20 MIN: CPT | Performed by: STUDENT IN AN ORGANIZED HEALTH CARE EDUCATION/TRAINING PROGRAM

## 2025-06-16 PROCEDURE — 3078F DIAST BP <80 MM HG: CPT | Performed by: STUDENT IN AN ORGANIZED HEALTH CARE EDUCATION/TRAINING PROGRAM

## 2025-06-16 PROCEDURE — 3074F SYST BP LT 130 MM HG: CPT | Performed by: STUDENT IN AN ORGANIZED HEALTH CARE EDUCATION/TRAINING PROGRAM

## 2025-06-16 RX ORDER — FLUTICASONE PROPIONATE 50 MCG
1 SPRAY, SUSPENSION (ML) NASAL DAILY
Qty: 48 G | Refills: 3 | Status: SHIPPED | OUTPATIENT
Start: 2025-06-16

## 2025-06-16 RX ORDER — AZELASTINE 1 MG/ML
1 SPRAY, METERED NASAL 2 TIMES DAILY
Qty: 90 ML | Refills: 3 | Status: SHIPPED | OUTPATIENT
Start: 2025-06-16

## 2025-06-16 RX ORDER — FLUTICASONE PROPIONATE 50 MCG
1 SPRAY, SUSPENSION (ML) NASAL DAILY
Qty: 16 G | Refills: 11 | Status: SHIPPED | OUTPATIENT
Start: 2025-06-16 | End: 2025-06-16

## 2025-06-16 RX ORDER — CETIRIZINE HYDROCHLORIDE 10 MG/1
10 TABLET ORAL DAILY
COMMUNITY

## 2025-06-16 RX ORDER — AZELASTINE 1 MG/ML
1 SPRAY, METERED NASAL 2 TIMES DAILY
Qty: 30 ML | Refills: 11 | Status: SHIPPED | OUTPATIENT
Start: 2025-06-16 | End: 2025-06-16

## 2025-06-16 ASSESSMENT — ENCOUNTER SYMPTOMS
WEIGHT LOSS: 0
VOMITING: 0
WHEEZING: 0
NAUSEA: 0
CHILLS: 0
HEADACHES: 0
DIZZINESS: 0
FEVER: 0
SHORTNESS OF BREATH: 0
PALPITATIONS: 0

## 2025-06-16 ASSESSMENT — FIBROSIS 4 INDEX: FIB4 SCORE: 0.71

## 2025-06-16 NOTE — PROGRESS NOTES
Subjective:     CC: ear pain     HPI:   Rai presents today with    Pmh stress, migraine,   Verbal consent was acquired by the patient to use [employer] ambient listening note generation during this visit Yes   History of Present Illness  The patient is a 29-year-old female who presents for an acute visit for ear pain and allergies.    She reports having allergies for her entire life, which have worsened this year, causing headaches and sinus pressure. Over-the-counter allergy medications have not been effective. She used Flonase prescribed to her mother, which provided some relief. She believes the sinus pressure from her allergies is causing her ear pain. Additionally, she sometimes grinds her teeth, which may contribute to the discomfort. The ear pain started last week, and she experienced feverish symptoms last night but no chills. The pain was sharp at times, causing her to tug on her ear. Today, the pain has improved, although she felt some discomfort upon waking up.    She has not consulted an allergist but is considering it. She has been taking generic Zyrtec, but it has not been effective. She has a history of ear issues from childhood, including having tubes placed in her ears. Her lymph nodes tend to swell during allergy season, but they are not severely painful.    PAST SURGICAL HISTORY:  - Ear tubes placement in childhood          Health Maintenance:     ROS:  Review of Systems   Constitutional:  Negative for chills, fever and weight loss.   HENT:  Negative for hearing loss.    Respiratory:  Negative for shortness of breath and wheezing.    Cardiovascular:  Negative for chest pain and palpitations.   Gastrointestinal:  Negative for nausea and vomiting.   Genitourinary:  Negative for frequency and urgency.   Skin:  Negative for rash.   Neurological:  Negative for dizziness and headaches.       Objective:     Exam:  BP 94/52 (BP Location: Left arm, Patient Position: Sitting, BP Cuff Size: Adult)   Pulse  "83   Temp 37.6 °C (99.6 °F) (Temporal)   Resp 14   Ht 1.626 m (5' 4\") Comment: pt reported  Wt 62.8 kg (138 lb 7.2 oz)   SpO2 98%   BMI 23.76 kg/m²  Body mass index is 23.76 kg/m².    Physical Exam  HENT:      Right Ear: Hearing, tympanic membrane, ear canal and external ear normal.      Left Ear: Hearing, tympanic membrane, ear canal and external ear normal.      Ears:        Comments: Left ear with prior scarring from prior tube placement          Labs:     Assessment & Plan:     29 y.o. female with the following -     1. Allergic rhinitis, unspecified seasonality, unspecified trigger (Primary)  2. Dysfunction of left eustachian tube  Chronic stable  Lifelong allergy.   Hx of ear infection in left and tube placement as child  No fluid seen behind the ear.   Plan  Continue daily cetirizine, start flonase and azelasting  Will refer to allergy  Consider montelukast if symptoms remain uncontrolled.   - fluticasone (FLONASE) 50 MCG/ACT nasal spray; Administer 1 Spray into affected nostril(S) every day.  Dispense: 16 g; Refill: 11  - azelastine (ASTELIN) 0.1 % nasal spray; Administer 1 Spray into affected nostril(S) 2 times a day.  Dispense: 30 mL; Refill: 11  - Referral to Allergy  - CBC WITH DIFFERENTIAL; Future            Return if symptoms worsen or fail to improve.    Please note that this dictation was created using voice recognition software. I have made every reasonable attempt to correct obvious errors, but I expect that there are errors of grammar and possibly content that I did not discover before finalizing the note.        "

## 2025-06-20 NOTE — Clinical Note
REFERRAL APPROVAL NOTICE         Sent on June 20, 2025                   Rai Carrero  [address] [address] [facility] [address]                   Dear Ms. Carrero,    After a careful review of the medical information and benefit coverage, [facility] has processed your referral. See below for additional details.    If applicable, you must be actively enrolled with your insurance for coverage of the authorized service. If you have any questions regarding your coverage, please contact your insurance directly.    REFERRAL INFORMATION   Referral #:  [id number]  Referred-To Provider    Referred-By Provider:  Allergy and Immunology    Nilton Holder M.D.   [facility]      [address]  [facility] [address]  [phone] [address] [address]  [phone]    Referral Start Date:  06/16/2025  Referral End Date:   06/16/2026             SCHEDULING  If you do not already have an appointment, please call [phone] to make an appointment.     MORE INFORMATION  If you do not already have a [facility] account, sign up at: [email or web address]  You can access your medical information, make appointments, see lab results, billing information, and more.  If you have questions regarding this referral, please contact  the [facility] Referrals department at:             [phone]. Monday - Friday 8:00AM - 5:00PM.     Sincerely,    [facility]